# Patient Record
Sex: MALE | Race: OTHER | Employment: FULL TIME | URBAN - METROPOLITAN AREA
[De-identification: names, ages, dates, MRNs, and addresses within clinical notes are randomized per-mention and may not be internally consistent; named-entity substitution may affect disease eponyms.]

---

## 2017-02-09 ENCOUNTER — OFFICE VISIT (OUTPATIENT)
Dept: INTERNAL MEDICINE CLINIC | Facility: CLINIC | Age: 42
End: 2017-02-09

## 2017-02-09 VITALS
HEIGHT: 68.25 IN | TEMPERATURE: 98 F | BODY MASS INDEX: 28.45 KG/M2 | WEIGHT: 187.75 LBS | RESPIRATION RATE: 21 BRPM | OXYGEN SATURATION: 99 % | SYSTOLIC BLOOD PRESSURE: 112 MMHG | DIASTOLIC BLOOD PRESSURE: 76 MMHG | HEART RATE: 87 BPM

## 2017-02-09 DIAGNOSIS — I25.2 HISTORY OF MYOCARDIAL INFARCTION: ICD-10-CM

## 2017-02-09 DIAGNOSIS — L98.9 SKIN DISORDER: ICD-10-CM

## 2017-02-09 DIAGNOSIS — M54.16 CHRONIC LUMBAR RADICULOPATHY: ICD-10-CM

## 2017-02-09 DIAGNOSIS — R53.82 CHRONIC FATIGUE SYNDROME: Primary | ICD-10-CM

## 2017-02-09 DIAGNOSIS — I25.10 CORONARY ARTERY DISEASE INVOLVING NATIVE CORONARY ARTERY OF NATIVE HEART WITHOUT ANGINA PECTORIS: ICD-10-CM

## 2017-02-09 PROCEDURE — 99204 OFFICE O/P NEW MOD 45 MIN: CPT | Performed by: INTERNAL MEDICINE

## 2017-02-09 PROCEDURE — 93000 ELECTROCARDIOGRAM COMPLETE: CPT | Performed by: INTERNAL MEDICINE

## 2017-02-09 NOTE — PROGRESS NOTES
Patient presents with:  Establish Care: New patient. Multiple medical concerns. HPI: The pt is a 38 y/o HM, bilingual, new patient, here to establish PCP and to discuss multiple medical concerns:    1. Chronic fatigue syndrome - Onset = > 1 year.   He Unknown health status[other] [OTHER] Father    • Obesity Mother    • Cancer Mother      Breast cancer   • Cancer Maternal Grandmother      Brain cancer   • Cancer Maternal Grandfather      Prostate cancer   • Diabetes Paternal Grandmother    • [facility]  [address] [facility],[address], [address], [facility], [address] [place] [address]  T: [id number]; F: [phone]       Orders Placed This Encounter  HGB A1C - CPX  URINALYSIS, ROUTINE - CPX  CBC W/DIFF - CPX  COMP METABOLIC PANEL - CPX  TSH - CPX  Testosterone, Total

## 2017-02-10 PROBLEM — R53.82 CHRONIC FATIGUE SYNDROME: Status: ACTIVE | Noted: 2017-02-10

## 2017-02-10 PROBLEM — M54.16 CHRONIC LUMBAR RADICULOPATHY: Status: ACTIVE | Noted: 2017-02-10

## 2017-02-10 PROBLEM — G93.32 CHRONIC FATIGUE SYNDROME: Status: ACTIVE | Noted: 2017-02-10

## 2017-02-15 ENCOUNTER — LAB ENCOUNTER (OUTPATIENT)
Dept: LAB | Age: 42
End: 2017-02-15
Attending: INTERNAL MEDICINE
Payer: COMMERCIAL

## 2017-02-15 DIAGNOSIS — R53.82 CHRONIC FATIGUE SYNDROME: ICD-10-CM

## 2017-02-15 DIAGNOSIS — I25.10 CORONARY ARTERY DISEASE INVOLVING NATIVE CORONARY ARTERY OF NATIVE HEART WITHOUT ANGINA PECTORIS: ICD-10-CM

## 2017-02-15 DIAGNOSIS — I25.2 HISTORY OF MYOCARDIAL INFARCTION: ICD-10-CM

## 2017-02-15 LAB
25-HYDROXYVITAMIN D (TOTAL): 6.6 NG/ML (ref 30–100)
ALBUMIN SERPL-MCNC: 4.5 G/DL (ref 3.5–4.8)
ALP LIVER SERPL-CCNC: 71 U/L (ref 45–117)
ALT SERPL-CCNC: 33 U/L (ref 17–63)
AST SERPL-CCNC: 19 U/L (ref 15–41)
BASOPHILS # BLD AUTO: 0.02 X10(3) UL (ref 0–0.1)
BASOPHILS NFR BLD AUTO: 0.4 %
BILIRUB SERPL-MCNC: 0.7 MG/DL (ref 0.1–2)
BILIRUB UR QL STRIP.AUTO: NEGATIVE
BUN BLD-MCNC: 10 MG/DL (ref 8–20)
CALCIUM BLD-MCNC: 9.3 MG/DL (ref 8.3–10.3)
CHLORIDE: 105 MMOL/L (ref 101–111)
CHOLEST SMN-MCNC: 197 MG/DL (ref ?–200)
CLARITY UR REFRACT.AUTO: CLEAR
CO2: 27 MMOL/L (ref 22–32)
COLOR UR AUTO: YELLOW
CREAT BLD-MCNC: 1.05 MG/DL (ref 0.7–1.3)
DEPRECATED HBV CORE AB SER IA-ACNC: 180.6 NG/ML (ref 22–322)
EOSINOPHIL # BLD AUTO: 0.09 X10(3) UL (ref 0–0.3)
EOSINOPHIL NFR BLD AUTO: 1.6 %
ERYTHROCYTE [DISTWIDTH] IN BLOOD BY AUTOMATED COUNT: 13 % (ref 11.5–16)
EST. AVERAGE GLUCOSE BLD GHB EST-MCNC: 105 MG/DL (ref 68–126)
FOLATE (FOLIC ACID), SERUM: 15.4 NG/ML (ref 8.7–24)
GLUCOSE BLD-MCNC: 92 MG/DL (ref 70–99)
GLUCOSE UR STRIP.AUTO-MCNC: NEGATIVE MG/DL
HAV AB SERPL IA-ACNC: 506 PG/ML (ref 193–986)
HBA1C MFR BLD HPLC: 5.3 % (ref ?–5.7)
HCT VFR BLD AUTO: 44.7 % (ref 37–53)
HDLC SERPL-MCNC: 43 MG/DL (ref 45–?)
HDLC SERPL: 4.58 {RATIO} (ref ?–4.97)
HGB BLD-MCNC: 14.8 G/DL (ref 13–17)
IMMATURE GRANULOCYTE COUNT: 0.01 X10(3) UL (ref 0–1)
IMMATURE GRANULOCYTE RATIO %: 0.2 %
IRON SATURATION: 29 % (ref 13–45)
IRON: 115 UG/DL (ref 45–182)
KETONES UR STRIP.AUTO-MCNC: NEGATIVE MG/DL
LDLC SERPL CALC-MCNC: 109 MG/DL (ref ?–130)
LEUKOCYTE ESTERASE UR QL STRIP.AUTO: NEGATIVE
LYMPHOCYTES # BLD AUTO: 2.11 X10(3) UL (ref 0.9–4)
LYMPHOCYTES NFR BLD AUTO: 38.4 %
M PROTEIN MFR SERPL ELPH: 7.7 G/DL (ref 6.1–8.3)
MCH RBC QN AUTO: 28.4 PG (ref 27–33.2)
MCHC RBC AUTO-ENTMCNC: 33.1 G/DL (ref 31–37)
MCV RBC AUTO: 85.8 FL (ref 80–99)
MONOCYTES # BLD AUTO: 0.41 X10(3) UL (ref 0.1–0.6)
MONOCYTES NFR BLD AUTO: 7.5 %
NEUTROPHIL ABS PRELIM: 2.85 X10 (3) UL (ref 1.3–6.7)
NEUTROPHILS # BLD AUTO: 2.85 X10(3) UL (ref 1.3–6.7)
NEUTROPHILS NFR BLD AUTO: 51.9 %
NITRITE UR QL STRIP.AUTO: NEGATIVE
NONHDLC SERPL-MCNC: 154 MG/DL (ref ?–130)
PH UR STRIP.AUTO: 7 [PH] (ref 4.5–8)
PLATELET # BLD AUTO: 248 10(3)UL (ref 150–450)
POTASSIUM SERPL-SCNC: 4.1 MMOL/L (ref 3.6–5.1)
PROT UR STRIP.AUTO-MCNC: NEGATIVE MG/DL
RBC # BLD AUTO: 5.21 X10(6)UL (ref 4.3–5.7)
RED CELL DISTRIBUTION WIDTH-SD: 40.2 FL (ref 35.1–46.3)
SODIUM SERPL-SCNC: 139 MMOL/L (ref 136–144)
SP GR UR STRIP.AUTO: 1.02 (ref 1–1.03)
TOTAL IRON BINDING CAPACITY: 392 UG/DL (ref 298–536)
TRANSFERRIN: 263 MG/DL (ref 200–360)
TRIGLYCERIDES: 226 MG/DL (ref ?–150)
TSI SER-ACNC: 2.67 MIU/ML (ref 0.35–5.5)
UROBILINOGEN UR STRIP.AUTO-MCNC: <2 MG/DL
VLDL: 45 MG/DL (ref 5–40)
WBC # BLD AUTO: 5.5 X10(3) UL (ref 4–13)

## 2017-02-15 PROCEDURE — 82746 ASSAY OF FOLIC ACID SERUM: CPT | Performed by: INTERNAL MEDICINE

## 2017-02-15 PROCEDURE — 83540 ASSAY OF IRON: CPT | Performed by: INTERNAL MEDICINE

## 2017-02-15 PROCEDURE — 83036 HEMOGLOBIN GLYCOSYLATED A1C: CPT | Performed by: INTERNAL MEDICINE

## 2017-02-15 PROCEDURE — 80053 COMPREHEN METABOLIC PANEL: CPT | Performed by: INTERNAL MEDICINE

## 2017-02-15 PROCEDURE — 82607 VITAMIN B-12: CPT | Performed by: INTERNAL MEDICINE

## 2017-02-15 PROCEDURE — 82306 VITAMIN D 25 HYDROXY: CPT | Performed by: INTERNAL MEDICINE

## 2017-02-15 PROCEDURE — 84402 ASSAY OF FREE TESTOSTERONE: CPT | Performed by: INTERNAL MEDICINE

## 2017-02-15 PROCEDURE — 83550 IRON BINDING TEST: CPT | Performed by: INTERNAL MEDICINE

## 2017-02-15 PROCEDURE — 82728 ASSAY OF FERRITIN: CPT | Performed by: INTERNAL MEDICINE

## 2017-02-15 PROCEDURE — 80061 LIPID PANEL: CPT | Performed by: INTERNAL MEDICINE

## 2017-02-15 PROCEDURE — 85025 COMPLETE CBC W/AUTO DIFF WBC: CPT | Performed by: INTERNAL MEDICINE

## 2017-02-15 PROCEDURE — 81001 URINALYSIS AUTO W/SCOPE: CPT | Performed by: INTERNAL MEDICINE

## 2017-02-15 PROCEDURE — 84443 ASSAY THYROID STIM HORMONE: CPT | Performed by: INTERNAL MEDICINE

## 2017-02-15 PROCEDURE — 36415 COLL VENOUS BLD VENIPUNCTURE: CPT | Performed by: INTERNAL MEDICINE

## 2017-02-15 PROCEDURE — 84403 ASSAY OF TOTAL TESTOSTERONE: CPT | Performed by: INTERNAL MEDICINE

## 2017-02-18 LAB
TESTOSTERONE TOTAL: 499 NG/DL
TESTOSTERONE, FREE -MS/MS: 113 PG/ML

## 2017-02-19 DIAGNOSIS — E55.9 VITAMIN D DEFICIENCY: Primary | ICD-10-CM

## 2017-02-19 RX ORDER — ERGOCALCIFEROL 1.25 MG/1
50000 CAPSULE ORAL
Qty: 12 CAPSULE | Refills: 0 | Status: SHIPPED | OUTPATIENT
Start: 2017-02-19 | End: 2019-01-29 | Stop reason: ALTCHOICE

## 2017-02-19 NOTE — PROGRESS NOTES
Script for Vitamin D sent to his pharmacy. Juanita Kwan. Francy Loyola MD  Diplomate, American Board of Internal Medicine  [facility]  [address] [facility],[address], [address], [facility], [address] [place] [address]  T: [id number]; F: [phone]

## 2017-03-09 ENCOUNTER — HOSPITAL ENCOUNTER (OUTPATIENT)
Dept: GENERAL RADIOLOGY | Age: 42
Discharge: HOME | End: 2017-03-09
Attending: INTERNAL MEDICINE
Payer: COMMERCIAL

## 2017-03-09 ENCOUNTER — OFFICE VISIT (OUTPATIENT)
Dept: INTERNAL MEDICINE CLINIC | Facility: CLINIC | Age: 42
End: 2017-03-09

## 2017-03-09 VITALS
SYSTOLIC BLOOD PRESSURE: 120 MMHG | RESPIRATION RATE: 17 BRPM | TEMPERATURE: 98 F | BODY MASS INDEX: 27.51 KG/M2 | DIASTOLIC BLOOD PRESSURE: 80 MMHG | WEIGHT: 181.5 LBS | OXYGEN SATURATION: 99 % | HEIGHT: 68 IN | HEART RATE: 75 BPM

## 2017-03-09 DIAGNOSIS — G89.29 CHRONIC LEFT SHOULDER PAIN: ICD-10-CM

## 2017-03-09 DIAGNOSIS — E55.9 VITAMIN D DEFICIENCY: ICD-10-CM

## 2017-03-09 DIAGNOSIS — M25.512 CHRONIC LEFT SHOULDER PAIN: ICD-10-CM

## 2017-03-09 DIAGNOSIS — G89.29 CHRONIC PAIN OF LEFT KNEE: ICD-10-CM

## 2017-03-09 DIAGNOSIS — M54.16 CHRONIC LUMBAR RADICULOPATHY: Primary | ICD-10-CM

## 2017-03-09 DIAGNOSIS — M25.562 CHRONIC PAIN OF LEFT KNEE: ICD-10-CM

## 2017-03-09 PROCEDURE — 99214 OFFICE O/P EST MOD 30 MIN: CPT | Performed by: INTERNAL MEDICINE

## 2017-03-09 PROCEDURE — 73560 X-RAY EXAM OF KNEE 1 OR 2: CPT

## 2017-03-09 PROCEDURE — 73030 X-RAY EXAM OF SHOULDER: CPT

## 2017-03-09 NOTE — PROGRESS NOTES
Patient presents with: Follow - Up: back pain, L shoulder pain and L knee pain   Other: Vitamin D def      HPI: The pt presents today for f/u chronic lumbar radiculopathy, but also presents today for L shoulder pain and L knee pain evaluations.   He's also crepitus in arc of extension but no joint tenderness; negative Luis A's  Back - ROM seems improved when compared to previous visit  Ext - no c/c/e  Neuro - nl gait; 2+ DTRs  Psych - nl mood/affect      A/P:  Chronic lumbar radiculopathy  (primary encount

## 2017-03-24 ENCOUNTER — APPOINTMENT (OUTPATIENT)
Dept: LAB | Age: 42
End: 2017-03-24
Attending: INTERNAL MEDICINE
Payer: COMMERCIAL

## 2017-03-24 DIAGNOSIS — E55.9 VITAMIN D DEFICIENCY: ICD-10-CM

## 2017-03-24 LAB — 25-HYDROXYVITAMIN D (TOTAL): 29 NG/ML (ref 30–100)

## 2017-03-24 PROCEDURE — 82306 VITAMIN D 25 HYDROXY: CPT

## 2017-03-24 PROCEDURE — 36415 COLL VENOUS BLD VENIPUNCTURE: CPT

## 2017-03-27 PROBLEM — E55.9 VITAMIN D DEFICIENCY: Status: RESOLVED | Noted: 2017-02-19 | Resolved: 2017-03-27

## 2017-05-25 ENCOUNTER — HOSPITAL ENCOUNTER (OUTPATIENT)
Dept: GENERAL RADIOLOGY | Age: 42
Discharge: HOME | End: 2017-05-25
Attending: INTERNAL MEDICINE
Payer: COMMERCIAL

## 2017-05-25 ENCOUNTER — OFFICE VISIT (OUTPATIENT)
Dept: INTERNAL MEDICINE CLINIC | Facility: CLINIC | Age: 42
End: 2017-05-25

## 2017-05-25 VITALS
TEMPERATURE: 98 F | RESPIRATION RATE: 16 BRPM | DIASTOLIC BLOOD PRESSURE: 76 MMHG | WEIGHT: 180 LBS | BODY MASS INDEX: 27.28 KG/M2 | HEIGHT: 68 IN | SYSTOLIC BLOOD PRESSURE: 112 MMHG | HEART RATE: 88 BPM

## 2017-05-25 DIAGNOSIS — M54.6 ACUTE BILATERAL THORACIC BACK PAIN: ICD-10-CM

## 2017-05-25 DIAGNOSIS — R07.89 CHEST WALL PAIN: ICD-10-CM
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
## 2017-05-25 DIAGNOSIS — M54.2 ACUTE NECK PAIN: ICD-10-CM

## 2017-05-25 DIAGNOSIS — S13.4XXA WHIPLASH INJURIES, INITIAL ENCOUNTER: ICD-10-CM

## 2017-05-25 DIAGNOSIS — M54.2 ACUTE NECK PAIN: Primary | ICD-10-CM

## 2017-05-25 PROCEDURE — 72050 X-RAY EXAM NECK SPINE 4/5VWS: CPT | Performed by: INTERNAL MEDICINE

## 2017-05-25 PROCEDURE — 71020 XR CHEST PA + LAT CHEST (CPT=71020): CPT | Performed by: INTERNAL MEDICINE

## 2017-05-25 PROCEDURE — 99214 OFFICE O/P EST MOD 30 MIN: CPT | Performed by: INTERNAL MEDICINE

## 2017-05-25 PROCEDURE — 72072 X-RAY EXAM THORAC SPINE 3VWS: CPT | Performed by: INTERNAL MEDICINE

## 2017-05-25 RX ORDER — ALPRAZOLAM 0.25 MG/1
0.25 TABLET ORAL EVERY 6 HOURS PRN
Qty: 10 TABLET | Refills: 0 | Status: SHIPPED | OUTPATIENT
Start: 2017-05-25 | End: 2017-06-08
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
## 2017-05-31 ENCOUNTER — HOSPITAL ENCOUNTER (OUTPATIENT)
Age: 42
Discharge: HOME | End: 2017-05-31
Attending: FAMILY MEDICINE
Payer: COMMERCIAL

## 2017-05-31 VITALS
WEIGHT: 176 LBS | HEART RATE: 84 BPM | SYSTOLIC BLOOD PRESSURE: 119 MMHG | OXYGEN SATURATION: 97 % | RESPIRATION RATE: 20 BRPM | HEIGHT: 68 IN | BODY MASS INDEX: 26.67 KG/M2 | TEMPERATURE: 98 F | DIASTOLIC BLOOD PRESSURE: 81 MMHG

## 2017-05-31 DIAGNOSIS — M62.838 TRAPEZIUS MUSCLE SPASM: ICD-10-CM
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
## 2017-05-31 DIAGNOSIS — S16.1XXA CERVICAL STRAIN, INITIAL ENCOUNTER: Primary | ICD-10-CM

## 2017-05-31 PROCEDURE — 99204 OFFICE O/P NEW MOD 45 MIN: CPT

## 2017-05-31 PROCEDURE — 96372 THER/PROPH/DIAG INJ SC/IM: CPT

## 2017-05-31 PROCEDURE — 99214 OFFICE O/P EST MOD 30 MIN: CPT

## 2017-05-31 RX ORDER — METAXALONE 800 MG/1
800 TABLET ORAL 2 TIMES DAILY
Qty: 20 TABLET | Refills: 0 | Status: SHIPPED | OUTPATIENT
Start: 2017-05-31 | End: 2018-07-03

## 2017-05-31 RX ORDER — KETOROLAC TROMETHAMINE 30 MG/ML
60 INJECTION, SOLUTION INTRAMUSCULAR; INTRAVENOUS ONCE
Status: COMPLETED | OUTPATIENT
Start: 2017-05-31 | End: 2017-05-31

## 2017-05-31 RX ORDER — HYDROCODONE BITARTRATE AND ACETAMINOPHEN 5; 325 MG/1; MG/1
1 TABLET ORAL EVERY 6 HOURS PRN
Qty: 20 TABLET | Refills: 0 | Status: SHIPPED | OUTPATIENT
Start: 2017-05-31 | End: 2018-07-03

## 2017-06-01 ENCOUNTER — TELEPHONE (OUTPATIENT)
Dept: INTERNAL MEDICINE CLINIC | Facility: CLINIC | Age: 42
End: 2017-06-01

## 2017-06-01 DIAGNOSIS — S46.819A TRAPEZIUS STRAIN, UNSPECIFIED LATERALITY, INITIAL ENCOUNTER: ICD-10-CM

## 2017-06-01 DIAGNOSIS — S16.1XXA CERVICAL STRAIN, INITIAL ENCOUNTER: ICD-10-CM

## 2017-06-01 DIAGNOSIS — S13.4XXA ACUTE WHIPLASH INJURY, INITIAL ENCOUNTER: Primary | ICD-10-CM
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
## 2017-06-02 ENCOUNTER — TELEPHONE (OUTPATIENT)
Dept: INTERNAL MEDICINE CLINIC | Facility: CLINIC | Age: 42
End: 2017-06-02

## 2017-06-02 NOTE — TELEPHONE ENCOUNTER
I will write a note for today, he can discuss further with Dr. Andre Scott at next week's visit if he requires further time off. Note in his chart, copy in my out box.

## 2017-06-06 ENCOUNTER — OFFICE VISIT (OUTPATIENT)
Dept: PHYSICAL THERAPY | Age: 42
End: 2017-06-06
Attending: INTERNAL MEDICINE
Payer: COMMERCIAL

## 2017-06-06 DIAGNOSIS — S13.4XXA ACUTE WHIPLASH INJURY, INITIAL ENCOUNTER: Primary | ICD-10-CM

## 2017-06-06 DIAGNOSIS — S16.1XXA CERVICAL STRAIN, INITIAL ENCOUNTER: ICD-10-CM

## 2017-06-06 DIAGNOSIS — S46.819A TRAPEZIUS STRAIN, UNSPECIFIED LATERALITY, INITIAL ENCOUNTER: ICD-10-CM

## 2017-06-06 PROCEDURE — 97163 PT EVAL HIGH COMPLEX 45 MIN: CPT

## 2017-06-06 PROCEDURE — 97110 THERAPEUTIC EXERCISES: CPT
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
## 2017-06-08 ENCOUNTER — OFFICE VISIT (OUTPATIENT)
Dept: PHYSICAL THERAPY | Age: 42
End: 2017-06-08
Attending: INTERNAL MEDICINE
Payer: COMMERCIAL

## 2017-06-08 ENCOUNTER — OFFICE VISIT (OUTPATIENT)
Dept: INTERNAL MEDICINE CLINIC | Facility: CLINIC | Age: 42
End: 2017-06-08

## 2017-06-08 VITALS
HEIGHT: 68.5 IN | DIASTOLIC BLOOD PRESSURE: 84 MMHG | HEART RATE: 72 BPM | WEIGHT: 179.5 LBS | BODY MASS INDEX: 26.89 KG/M2 | TEMPERATURE: 98 F | RESPIRATION RATE: 17 BRPM | SYSTOLIC BLOOD PRESSURE: 140 MMHG

## 2017-06-08 DIAGNOSIS — R51.9 GENERALIZED HEADACHES: ICD-10-CM

## 2017-06-08 DIAGNOSIS — S16.1XXD CERVICAL STRAIN, SUBSEQUENT ENCOUNTER: ICD-10-CM

## 2017-06-08 DIAGNOSIS — M54.12 CERVICAL RADICULOPATHY: ICD-10-CM
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
## 2017-06-08 DIAGNOSIS — R42 DIZZINESS: ICD-10-CM

## 2017-06-08 PROCEDURE — 97140 MANUAL THERAPY 1/> REGIONS: CPT

## 2017-06-08 PROCEDURE — 97110 THERAPEUTIC EXERCISES: CPT

## 2017-06-08 PROCEDURE — 99214 OFFICE O/P EST MOD 30 MIN: CPT | Performed by: INTERNAL MEDICINE

## 2017-06-08 RX ORDER — METHYLPREDNISOLONE 4 MG/1
TABLET ORAL
Qty: 1 KIT | Refills: 0 | Status: SHIPPED | OUTPATIENT
Start: 2017-06-08 | End: 2018-07-03
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
## 2017-06-13 ENCOUNTER — OFFICE VISIT (OUTPATIENT)
Dept: PHYSICAL THERAPY | Age: 42
End: 2017-06-13
Attending: INTERNAL MEDICINE
Payer: COMMERCIAL

## 2017-06-13 PROCEDURE — 97110 THERAPEUTIC EXERCISES: CPT

## 2017-06-13 PROCEDURE — 97140 MANUAL THERAPY 1/> REGIONS: CPT
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
## 2017-06-15 ENCOUNTER — OFFICE VISIT (OUTPATIENT)
Dept: PHYSICAL THERAPY | Age: 42
End: 2017-06-15
Attending: INTERNAL MEDICINE
Payer: COMMERCIAL

## 2017-06-15 PROCEDURE — 97140 MANUAL THERAPY 1/> REGIONS: CPT

## 2017-06-15 PROCEDURE — 97110 THERAPEUTIC EXERCISES: CPT
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
## 2017-06-27 ENCOUNTER — APPOINTMENT (OUTPATIENT)
Dept: PHYSICAL THERAPY | Age: 42
End: 2017-06-27
Attending: INTERNAL MEDICINE
Payer: COMMERCIAL

## 2017-06-29 ENCOUNTER — APPOINTMENT (OUTPATIENT)
Dept: PHYSICAL THERAPY | Age: 42
End: 2017-06-29
Attending: INTERNAL MEDICINE
Payer: COMMERCIAL

## 2018-07-03 ENCOUNTER — OFFICE VISIT (OUTPATIENT)
Dept: INTERNAL MEDICINE CLINIC | Facility: CLINIC | Age: 43
End: 2018-07-03

## 2018-07-03 VITALS
DIASTOLIC BLOOD PRESSURE: 68 MMHG | HEIGHT: 68.5 IN | OXYGEN SATURATION: 98 % | HEART RATE: 95 BPM | WEIGHT: 179 LBS | RESPIRATION RATE: 16 BRPM | TEMPERATURE: 98 F | SYSTOLIC BLOOD PRESSURE: 120 MMHG | BODY MASS INDEX: 26.82 KG/M2

## 2018-07-03 DIAGNOSIS — M54.50 CHRONIC LEFT-SIDED LOW BACK PAIN WITHOUT SCIATICA: ICD-10-CM

## 2018-07-03 DIAGNOSIS — Z00.00 WELLNESS EXAMINATION: Primary | ICD-10-CM

## 2018-07-03 DIAGNOSIS — M54.2 CHRONIC NECK PAIN: ICD-10-CM

## 2018-07-03 DIAGNOSIS — Z00.00 LABORATORY EXAM ORDERED AS PART OF ROUTINE GENERAL MEDICAL EXAMINATION: ICD-10-CM

## 2018-07-03 DIAGNOSIS — M25.562 CHRONIC PAIN OF BOTH KNEES: ICD-10-CM

## 2018-07-03 DIAGNOSIS — G89.29 CHRONIC LEFT-SIDED LOW BACK PAIN WITHOUT SCIATICA: ICD-10-CM

## 2018-07-03 DIAGNOSIS — G89.29 CHRONIC PAIN OF BOTH KNEES: ICD-10-CM
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
## 2018-07-03 DIAGNOSIS — G89.29 CHRONIC NECK PAIN: ICD-10-CM

## 2018-07-03 DIAGNOSIS — S91.351A DOG BITE OF RIGHT FOOT, INITIAL ENCOUNTER: ICD-10-CM

## 2018-07-03 DIAGNOSIS — K59.00 CONSTIPATION, UNSPECIFIED CONSTIPATION TYPE: ICD-10-CM

## 2018-07-03 DIAGNOSIS — E55.9 HYPOVITAMINOSIS D: ICD-10-CM

## 2018-07-03 DIAGNOSIS — M25.561 CHRONIC PAIN OF BOTH KNEES: ICD-10-CM

## 2018-07-03 PROCEDURE — 99396 PREV VISIT EST AGE 40-64: CPT | Performed by: INTERNAL MEDICINE

## 2018-07-03 PROCEDURE — 99213 OFFICE O/P EST LOW 20 MIN: CPT | Performed by: INTERNAL MEDICINE

## 2018-07-03 RX ORDER — NAPROXEN 500 MG/1
500 TABLET ORAL 2 TIMES DAILY WITH MEALS
Qty: 30 TABLET | Refills: 0 | Status: SHIPPED | OUTPATIENT
Start: 2018-07-03 | End: 2019-01-29 | Stop reason: ALTCHOICE

## 2018-07-03 NOTE — PROGRESS NOTES
[facility] Internal Medicine Office Note  Chief Complaint:   Patient presents with: Well Adult: annual  Bite (integumentary): follow up on dog bite right foot.        HPI:   This is a 40year old male coming in for physical and joint pains  HPI Prescriptions:  Nerve Stimulator (TENS THERAPY PAIN RELIEF) Does not apply Device 1 Application by Does not apply route 2 (two) times daily.  Disp: 1 Device Rfl: 0   ergocalciferol 41722 units Oral Cap Take 1 capsule (50,000 Units total) by mouth every 7 da He exhibits tenderness (of lower cervical spine and upper lumbar spine). Neurological: He is alert. Skin: No rash noted. Psychiatric: He has a normal mood and affect.        ASSESSMENT AND PLAN:   Natasha Vincent is a 40year old male with  Usman La unspecified constipation type - continue probiotic and plenty of water.  Add fiber supplement such as benefiber or metamucil. miralax if no BM in more than 2 days      Orders Placed This Encounter      TSH W Reflex To Free T4 [E]      Lipid Panel [E]      C

## 2018-07-03 NOTE — PATIENT INSTRUCTIONS
Blood work     Start 7-10 day course of naproxen twice daily and take with food     Consider miralax if no bowel movement in several days  Consider fiber supplement metamucil or benefiber     Start physical therapy     Follow up with orthopedics/Dr. Clint Abad if

## 2018-07-07 ENCOUNTER — LAB ENCOUNTER (OUTPATIENT)
Dept: LAB | Facility: HOSPITAL | Age: 43
End: 2018-07-07
Attending: INTERNAL MEDICINE
Payer: COMMERCIAL

## 2018-07-07 DIAGNOSIS — Z20.2 POSSIBLE EXPOSURE TO STD: ICD-10-CM

## 2018-07-07 DIAGNOSIS — E55.9 HYPOVITAMINOSIS D: ICD-10-CM

## 2018-07-07 DIAGNOSIS — Z00.00 LABORATORY EXAM ORDERED AS PART OF ROUTINE GENERAL MEDICAL EXAMINATION: ICD-10-CM

## 2018-07-07 LAB
25-HYDROXYVITAMIN D (TOTAL): 21.6 NG/ML (ref 30–100)
ALBUMIN SERPL-MCNC: 4.1 G/DL (ref 3.5–4.8)
ALP LIVER SERPL-CCNC: 61 U/L (ref 45–117)
ALT SERPL-CCNC: 40 U/L (ref 17–63)
AST SERPL-CCNC: 22 U/L (ref 15–41)
BASOPHILS # BLD AUTO: 0.02 X10(3) UL (ref 0–0.1)
BASOPHILS NFR BLD AUTO: 0.3 %
BILIRUB SERPL-MCNC: 0.5 MG/DL (ref 0.1–2)
BUN BLD-MCNC: 16 MG/DL (ref 8–20)
CALCIUM BLD-MCNC: 8.7 MG/DL (ref 8.3–10.3)
CHLORIDE: 109 MMOL/L (ref 101–111)
CHOLEST SMN-MCNC: 195 MG/DL (ref ?–200)
CO2: 27 MMOL/L (ref 22–32)
CREAT BLD-MCNC: 0.88 MG/DL (ref 0.7–1.3)
EOSINOPHIL # BLD AUTO: 0.14 X10(3) UL (ref 0–0.3)
EOSINOPHIL NFR BLD AUTO: 2.1 %
ERYTHROCYTE [DISTWIDTH] IN BLOOD BY AUTOMATED COUNT: 12.9 % (ref 11.5–16)
GLUCOSE BLD-MCNC: 74 MG/DL (ref 70–99)
HCT VFR BLD AUTO: 41.7 % (ref 37–53)
HDLC SERPL-MCNC: 41 MG/DL (ref 45–?)
HDLC SERPL: 4.76 {RATIO} (ref ?–4.97)
HGB BLD-MCNC: 13.6 G/DL (ref 13–17)
IMMATURE GRANULOCYTE COUNT: 0.03 X10(3) UL (ref 0–1)
IMMATURE GRANULOCYTE RATIO %: 0.5 %
LDLC SERPL CALC-MCNC: 105 MG/DL (ref ?–130)
LYMPHOCYTES # BLD AUTO: 1.78 X10(3) UL (ref 0.9–4)
LYMPHOCYTES NFR BLD AUTO: 26.8 %
M PROTEIN MFR SERPL ELPH: 7.4 G/DL (ref 6.1–8.3)
MCH RBC QN AUTO: 27.6 PG (ref 27–33.2)
MCHC RBC AUTO-ENTMCNC: 32.6 G/DL (ref 31–37)
MCV RBC AUTO: 84.6 FL (ref 80–99)
MONOCYTES # BLD AUTO: 0.5 X10(3) UL (ref 0.1–1)
MONOCYTES NFR BLD AUTO: 7.5 %
NEUTROPHIL ABS PRELIM: 4.17 X10 (3) UL (ref 1.3–6.7)
NEUTROPHILS # BLD AUTO: 4.17 X10(3) UL (ref 1.3–6.7)
NEUTROPHILS NFR BLD AUTO: 62.8 %
NONHDLC SERPL-MCNC: 154 MG/DL (ref ?–130)
PLATELET # BLD AUTO: 246 10(3)UL (ref 150–450)
POTASSIUM SERPL-SCNC: 4.1 MMOL/L (ref 3.6–5.1)
RBC # BLD AUTO: 4.93 X10(6)UL (ref 4.3–5.7)
RED CELL DISTRIBUTION WIDTH-SD: 39.3 FL (ref 35.1–46.3)
SODIUM SERPL-SCNC: 141 MMOL/L (ref 136–144)
TRIGL SERPL-MCNC: 244 MG/DL (ref ?–150)
TSI SER-ACNC: 3.24 MIU/ML (ref 0.35–5.5)
VLDLC SERPL CALC-MCNC: 49 MG/DL (ref 5–40)
WBC # BLD AUTO: 6.6 X10(3) UL (ref 4–13)

## 2018-07-07 PROCEDURE — 87389 HIV-1 AG W/HIV-1&-2 AB AG IA: CPT

## 2018-07-07 PROCEDURE — 86592 SYPHILIS TEST NON-TREP QUAL: CPT

## 2018-07-07 PROCEDURE — 36415 COLL VENOUS BLD VENIPUNCTURE: CPT

## 2018-07-07 PROCEDURE — 84443 ASSAY THYROID STIM HORMONE: CPT

## 2018-07-07 PROCEDURE — 86780 TREPONEMA PALLIDUM: CPT

## 2018-07-07 PROCEDURE — 80053 COMPREHEN METABOLIC PANEL: CPT

## 2018-07-07 PROCEDURE — 85025 COMPLETE CBC W/AUTO DIFF WBC: CPT

## 2018-07-07 PROCEDURE — 80061 LIPID PANEL: CPT

## 2018-07-07 PROCEDURE — 82306 VITAMIN D 25 HYDROXY: CPT

## 2018-07-09 ENCOUNTER — TELEPHONE (OUTPATIENT)
Dept: INTERNAL MEDICINE CLINIC | Facility: CLINIC | Age: 43
End: 2018-07-09

## 2018-07-09 DIAGNOSIS — Z20.2 POSSIBLE EXPOSURE TO STD: Primary | ICD-10-CM

## 2018-07-09 NOTE — TELEPHONE ENCOUNTER
Patient was seen by Dr Faviola Chi and he was told he would get orders for STD testing as well as labs that were drawn. He got others drawn but they did not have orders for STD; please request additional orders be entered by doctor.  Call patient back when ente

## 2018-07-10 LAB
RPR SER QL: NONREACTIVE
T PALLIDUM AB SER QL IA: NONREACTIVE

## 2018-07-10 NOTE — TELEPHONE ENCOUNTER
My apologies to patient  Blood work orders were added on to blood already drawn for HIV and syphilis.     He just needs to come to lab for urine sample for gonorrhea/chlamydia testing

## 2018-07-11 RX ORDER — ERGOCALCIFEROL 1.25 MG/1
50000 CAPSULE ORAL WEEKLY
Qty: 12 CAPSULE | Refills: 0 | Status: SHIPPED | OUTPATIENT
Start: 2018-07-11 | End: 2018-10-09

## 2019-01-29 ENCOUNTER — OFFICE VISIT (OUTPATIENT)
Dept: INTERNAL MEDICINE CLINIC | Facility: CLINIC | Age: 44
End: 2019-01-29
Payer: COMMERCIAL

## 2019-01-29 VITALS
WEIGHT: 177.25 LBS | SYSTOLIC BLOOD PRESSURE: 116 MMHG | HEIGHT: 68.75 IN | BODY MASS INDEX: 26.25 KG/M2 | HEART RATE: 91 BPM | OXYGEN SATURATION: 98 % | RESPIRATION RATE: 16 BRPM | DIASTOLIC BLOOD PRESSURE: 66 MMHG | TEMPERATURE: 98 F

## 2019-01-29 DIAGNOSIS — M54.2 NECK PAIN ON LEFT SIDE: Primary | ICD-10-CM

## 2019-01-29 DIAGNOSIS — M54.16 CHRONIC LUMBAR RADICULOPATHY: ICD-10-CM

## 2019-01-29 DIAGNOSIS — G89.29 CHRONIC LEFT-SIDED HEADACHE: ICD-10-CM

## 2019-01-29 DIAGNOSIS — M54.41 CHRONIC BILATERAL LOW BACK PAIN WITH BILATERAL SCIATICA: ICD-10-CM

## 2019-01-29 DIAGNOSIS — G89.29 CHRONIC BILATERAL LOW BACK PAIN WITH BILATERAL SCIATICA: ICD-10-CM

## 2019-01-29 DIAGNOSIS — L98.9 SKIN LESION: ICD-10-CM

## 2019-01-29 DIAGNOSIS — H53.8 BLURRED VISION, LEFT EYE: ICD-10-CM

## 2019-01-29 DIAGNOSIS — M54.42 CHRONIC BILATERAL LOW BACK PAIN WITH BILATERAL SCIATICA: ICD-10-CM

## 2019-01-29 DIAGNOSIS — M54.12 CERVICAL RADICULOPATHY: ICD-10-CM

## 2019-01-29 DIAGNOSIS — R51.9 CHRONIC LEFT-SIDED HEADACHE: ICD-10-CM

## 2019-01-29 PROCEDURE — 99214 OFFICE O/P EST MOD 30 MIN: CPT | Performed by: PHYSICIAN ASSISTANT

## 2019-01-29 RX ORDER — HYDROCODONE BITARTRATE AND ACETAMINOPHEN 5; 325 MG/1; MG/1
1 TABLET ORAL NIGHTLY PRN
Qty: 12 TABLET | Refills: 0 | Status: SHIPPED | OUTPATIENT
Start: 2019-01-29 | End: 2019-02-11

## 2019-01-29 NOTE — PATIENT INSTRUCTIONS
Please call Stevenson Perez at [phone] to set up the following tests:  - MRI brain  - MRI cervical spine  - MRI lumbar spine    Pain:  - stop aleve (and do not take advil, ibuprofen, motrin, aspirin, naproxen)  - start diclofenac -
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
## 2019-02-09 ENCOUNTER — APPOINTMENT (OUTPATIENT)
Dept: MRI IMAGING | Age: 44
End: 2019-02-09
Attending: PHYSICIAN ASSISTANT
Payer: COMMERCIAL

## 2019-02-09 ENCOUNTER — HOSPITAL ENCOUNTER (OUTPATIENT)
Dept: MRI IMAGING | Age: 44
End: 2019-02-09
Attending: PHYSICIAN ASSISTANT
Payer: COMMERCIAL

## 2019-02-09 ENCOUNTER — HOSPITAL ENCOUNTER (OUTPATIENT)
Dept: MRI IMAGING | Facility: HOSPITAL | Age: 44
Discharge: HOME | End: 2019-02-09
Attending: PHYSICIAN ASSISTANT
Payer: COMMERCIAL

## 2019-02-09 DIAGNOSIS — G89.29 CHRONIC LEFT-SIDED HEADACHE: ICD-10-CM

## 2019-02-09 DIAGNOSIS — M54.16 CHRONIC LUMBAR RADICULOPATHY: ICD-10-CM

## 2019-02-09 DIAGNOSIS — R51.9 CHRONIC LEFT-SIDED HEADACHE: ICD-10-CM

## 2019-02-09 DIAGNOSIS — M54.41 CHRONIC BILATERAL LOW BACK PAIN WITH BILATERAL SCIATICA: ICD-10-CM

## 2019-02-09 DIAGNOSIS — G89.29 CHRONIC BILATERAL LOW BACK PAIN WITH BILATERAL SCIATICA: ICD-10-CM

## 2019-02-09 DIAGNOSIS — M54.42 CHRONIC BILATERAL LOW BACK PAIN WITH BILATERAL SCIATICA: ICD-10-CM

## 2019-02-09 DIAGNOSIS — H53.8 BLURRED VISION, LEFT EYE: ICD-10-CM

## 2019-02-09 DIAGNOSIS — M54.2 NECK PAIN ON LEFT SIDE: ICD-10-CM

## 2019-02-09 DIAGNOSIS — M54.12 CERVICAL RADICULOPATHY: ICD-10-CM

## 2019-02-09 PROCEDURE — 72141 MRI NECK SPINE W/O DYE: CPT | Performed by: PHYSICIAN ASSISTANT

## 2019-02-09 PROCEDURE — 72148 MRI LUMBAR SPINE W/O DYE: CPT | Performed by: PHYSICIAN ASSISTANT

## 2019-02-09 PROCEDURE — 70553 MRI BRAIN STEM W/O & W/DYE: CPT | Performed by: PHYSICIAN ASSISTANT

## 2019-02-09 PROCEDURE — A9575 INJ GADOTERATE MEGLUMI 0.1ML: HCPCS | Performed by: PHYSICIAN ASSISTANT

## 2019-02-11 NOTE — TELEPHONE ENCOUNTER
Pt requesting refill on norco 5-325 mg 1 tab qhs #12 until she is able to see pain management. Last refill 1/29/19. Last OV 1/29/19.

## 2019-02-12 ENCOUNTER — TELEPHONE (OUTPATIENT)
Dept: SURGERY | Facility: CLINIC | Age: 44
End: 2019-02-12

## 2019-02-12 NOTE — TELEPHONE ENCOUNTER
Refill requested:   Requested Prescriptions     Pending Prescriptions Disp Refills   • HYDROcodone-acetaminophen 5-325 MG Oral Tab 12 tablet 0     Sig: Take 1 tablet by mouth nightly as needed for Pain.        Non protocol medication    Last refill: 1/29/20

## 2019-02-12 NOTE — TELEPHONE ENCOUNTER
Refill requested:   Requested Prescriptions     Pending Prescriptions Disp Refills   • Diclofenac Sodium 50 MG Oral Tab EC 30 tablet 0     Sig: Take 1 tablet (50 mg total) by mouth 2 (two) times daily as needed. Take with food.    • HYDROcodone-acetaminophe

## 2019-02-13 RX ORDER — HYDROCODONE BITARTRATE AND ACETAMINOPHEN 5; 325 MG/1; MG/1
1 TABLET ORAL NIGHTLY PRN
Qty: 12 TABLET | Refills: 0 | OUTPATIENT
Start: 2019-02-13

## 2019-02-13 RX ORDER — HYDROCODONE BITARTRATE AND ACETAMINOPHEN 5; 325 MG/1; MG/1
1 TABLET ORAL NIGHTLY PRN
Qty: 12 TABLET | Refills: 0 | Status: SHIPPED | OUTPATIENT
Start: 2019-02-13 | End: 2019-03-02

## 2019-02-18 ENCOUNTER — TELEPHONE (OUTPATIENT)
Dept: INTERNAL MEDICINE CLINIC | Facility: CLINIC | Age: 44
End: 2019-02-18

## 2019-02-18 DIAGNOSIS — M54.12 CERVICAL RADICULOPATHY: Primary | ICD-10-CM

## 2019-02-18 DIAGNOSIS — M54.2 NECK PAIN ON LEFT SIDE: ICD-10-CM

## 2019-02-18 DIAGNOSIS — M54.16 CHRONIC LUMBAR RADICULOPATHY: ICD-10-CM

## 2019-02-18 NOTE — TELEPHONE ENCOUNTER
Pain Management Referral - Pt in office now   Received:  Today   Message Contents   Koko Patel [facility] Clinical Staff Cc: CIP[facility] Referral Pool   Phone Number: [phone]             .Reason for the order/referral: Back & Neck Pain   PCP: Darcy

## 2019-02-20 ENCOUNTER — TELEPHONE (OUTPATIENT)
Dept: PAIN CLINIC | Facility: CLINIC | Age: 44
End: 2019-02-20

## 2019-02-20 ENCOUNTER — OFFICE VISIT (OUTPATIENT)
Dept: PAIN CLINIC | Facility: CLINIC | Age: 44
End: 2019-02-20
Payer: COMMERCIAL

## 2019-02-20 VITALS
WEIGHT: 173 LBS | DIASTOLIC BLOOD PRESSURE: 78 MMHG | SYSTOLIC BLOOD PRESSURE: 126 MMHG | HEIGHT: 68 IN | HEART RATE: 94 BPM | OXYGEN SATURATION: 98 % | BODY MASS INDEX: 26.22 KG/M2

## 2019-02-20 DIAGNOSIS — M47.812 ARTHROPATHY OF CERVICAL FACET JOINT: Primary | ICD-10-CM

## 2019-02-20 DIAGNOSIS — M54.81 OCCIPITAL NEURALGIA OF LEFT SIDE: ICD-10-CM

## 2019-02-20 DIAGNOSIS — M47.812 OSTEOARTHRITIS OF CERVICAL SPINE, UNSPECIFIED SPINAL OSTEOARTHRITIS COMPLICATION STATUS: ICD-10-CM

## 2019-02-20 DIAGNOSIS — R93.89 ABNORMAL X-RAY: ICD-10-CM

## 2019-02-20 DIAGNOSIS — M54.16 LUMBAR RADICULOPATHY: ICD-10-CM

## 2019-02-20 DIAGNOSIS — M25.562 CHRONIC PAIN OF LEFT KNEE: ICD-10-CM

## 2019-02-20 DIAGNOSIS — G89.29 CHRONIC PAIN OF LEFT KNEE: ICD-10-CM

## 2019-02-20 PROCEDURE — 99203 OFFICE O/P NEW LOW 30 MIN: CPT | Performed by: NURSE PRACTITIONER

## 2019-02-20 RX ORDER — GABAPENTIN 100 MG/1
100 CAPSULE ORAL EVERY 8 HOURS
Qty: 90 CAPSULE | Refills: 0 | Status: SHIPPED | OUTPATIENT
Start: 2019-02-20 | End: 2019-03-18 | Stop reason: ALTCHOICE

## 2019-02-20 RX ORDER — METHOCARBAMOL 500 MG/1
500 TABLET, FILM COATED ORAL EVERY 8 HOURS PRN
Qty: 90 TABLET | Refills: 0 | Status: SHIPPED | OUTPATIENT
Start: 2019-02-20 | End: 2019-03-18 | Stop reason: ALTCHOICE

## 2019-02-20 RX ORDER — METHYLPREDNISOLONE 4 MG/1
TABLET ORAL
Qty: 1 KIT | Refills: 0 | Status: SHIPPED | OUTPATIENT
Start: 2019-02-20 | End: 2019-03-02

## 2019-02-20 NOTE — H&P
Name: Marina Chawla   : 1975   DOS: 2019     Chief complaint: Chronic neck and low back pain    History of present illness:  Marina Chawla is a 44year old male complaining of chronic neck and back pain.  Neck pain fla methocarbamol 500 MG Oral Tab Take 1 tablet (500 mg total) by mouth every 8 (eight) hours as needed (for muscle spasm). Disp: 90 tablet Rfl: 0   gabapentin 100 MG Oral Cap Take 1 capsule (100 mg total) by mouth every 8 (eight) hours.  Disp: 90 capsule Rfl examination:   General: Susy Delgado is a 44year old male not in acute distress  /78 (BP Location: Right arm, Patient Position: Sitting, Cuff Size: adult)   Pulse 94   Ht 68\"   Wt 173 lb   SpO2 98%   BMI 26.30 kg/m²    HEENT: No gross lesion noted.   Nec cord and nerve roots have normal caliber, contour, and signal intensity. PARASPINAL AREA:     No visible mass. OTHER:             Negative. LUMBAR DISC LEVELS:  L1-L2:   No significant disc/facet abnormality, spinal stenosis, or foraminal stenosis. Mild multilevel spondylosis without abnormal signal  CORD:  Normal caliber, contour, and signal intensity. OTHER:             Negative. CERVICAL DISC LEVELS:  C2-C3:  No significant disc/facet abnormality, spinal stenosis, or foraminal stenosis. or subacute ischemia. CEREBELLUM: No edema, hemorrhage, mass, acute infarction, or significant atrophy. BRAINSTEM:    The craniocervical junction is uncompromised.     CALVARIUM:    There is no apparent fracture, mass, or other significant visible lesio injection  Left greater occipital nerve block  I have discussed the injection procedures, risks, and benefits with patient today and he would like to proceed. Follow up in office 2 weeks after last injection for reevaluation of pain at that time.  If neck

## 2019-02-20 NOTE — TELEPHONE ENCOUNTER
Medical clearance needed- no    Pt seen in OV today by Lisa and recommended for Facet and GONB with Dr. Rachid Jerez (X 2). Please begin PA process for procedure(s).      Laterality: left facet  Level(s): C3-7    Laterality: left  Level(s): GONB    Pt informed call
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
## 2019-02-21 NOTE — TELEPHONE ENCOUNTER
Started Pa online on [facility] for facet  Uploaded clinical notes  Pending ref # [id number]

## 2019-02-26 NOTE — TELEPHONE ENCOUNTER
Spoke to pt. Informed him of info below. Advised pt to allow a full week to see the effects and contact our office if still not experiencing relief. Pt verbalized understanding.

## 2019-02-26 NOTE — TELEPHONE ENCOUNTER
Called patient and scheduled for:    Appointment Date:  3/6/19  Procedure Time:  100 pm  Check-in Time:  1200 pm    Patient states that he has pre-op procedures. Patient verbalized understanding.

## 2019-02-26 NOTE — TELEPHONE ENCOUNTER
Patient states that he is taking gabapentin 3 times a day, but around 1030 pm he is noticing the pain is getting pretty bad. Patient is wondering if he can take 2 tabs instead of 1?  Patient states that the effects of medication only last a couple of hours

## 2019-02-26 NOTE — TELEPHONE ENCOUNTER
Facet approved [id number]   [id number]  DOS 2/27/19 please let me know what date this get scheduled for

## 2019-03-01 ENCOUNTER — TELEPHONE (OUTPATIENT)
Dept: SURGERY | Facility: CLINIC | Age: 44
End: 2019-03-01

## 2019-03-01 NOTE — TELEPHONE ENCOUNTER
Spoke to patient, confirmed procedure date of 3/6/19 and to be checked in at outpatient registration at 12:00 pm. Patient instructed to call pre-procedure line before procedure at [phone].  Patient instructed to call office if there are additional ques

## 2019-03-02 ENCOUNTER — HOSPITAL ENCOUNTER (EMERGENCY)
Facility: HOSPITAL | Age: 44
Discharge: HOME | End: 2019-03-02
Attending: EMERGENCY MEDICINE
Payer: COMMERCIAL

## 2019-03-02 VITALS
OXYGEN SATURATION: 100 % | TEMPERATURE: 98 F | WEIGHT: 173.06 LBS | HEART RATE: 78 BPM | SYSTOLIC BLOOD PRESSURE: 120 MMHG | BODY MASS INDEX: 26.23 KG/M2 | RESPIRATION RATE: 20 BRPM | DIASTOLIC BLOOD PRESSURE: 82 MMHG | HEIGHT: 68 IN

## 2019-03-02 DIAGNOSIS — R51.9 ACUTE NONINTRACTABLE HEADACHE, UNSPECIFIED HEADACHE TYPE: Primary | ICD-10-CM

## 2019-03-02 PROCEDURE — 99284 EMERGENCY DEPT VISIT MOD MDM: CPT | Performed by: EMERGENCY MEDICINE

## 2019-03-02 PROCEDURE — 96375 TX/PRO/DX INJ NEW DRUG ADDON: CPT | Performed by: EMERGENCY MEDICINE

## 2019-03-02 PROCEDURE — 96374 THER/PROPH/DIAG INJ IV PUSH: CPT | Performed by: EMERGENCY MEDICINE

## 2019-03-02 PROCEDURE — 96361 HYDRATE IV INFUSION ADD-ON: CPT | Performed by: EMERGENCY MEDICINE

## 2019-03-02 RX ORDER — TRAMADOL HYDROCHLORIDE 50 MG/1
TABLET ORAL EVERY 4 HOURS PRN
Qty: 10 TABLET | Refills: 0 | Status: SHIPPED | OUTPATIENT
Start: 2019-03-02 | End: 2019-03-09

## 2019-03-02 RX ORDER — DIPHENHYDRAMINE HYDROCHLORIDE 50 MG/ML
50 INJECTION INTRAMUSCULAR; INTRAVENOUS ONCE
Status: COMPLETED | OUTPATIENT
Start: 2019-03-02 | End: 2019-03-02

## 2019-03-02 RX ORDER — METOCLOPRAMIDE HYDROCHLORIDE 5 MG/ML
10 INJECTION INTRAMUSCULAR; INTRAVENOUS ONCE
Status: COMPLETED | OUTPATIENT
Start: 2019-03-02 | End: 2019-03-02

## 2019-03-02 RX ORDER — DEXAMETHASONE SODIUM PHOSPHATE 4 MG/ML
10 VIAL (ML) INJECTION ONCE
Status: COMPLETED | OUTPATIENT
Start: 2019-03-02 | End: 2019-03-02

## 2019-03-02 RX ORDER — KETOROLAC TROMETHAMINE 30 MG/ML
15 INJECTION, SOLUTION INTRAMUSCULAR; INTRAVENOUS ONCE
Status: COMPLETED | OUTPATIENT
Start: 2019-03-02 | End: 2019-03-02

## 2019-03-02 NOTE — ED INITIAL ASSESSMENT (HPI)
Arrives with coworker with c/o left occipital headache that began this morning. Sound makes the headache worse.
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
## 2019-03-04 ENCOUNTER — TELEPHONE (OUTPATIENT)
Dept: PAIN CLINIC | Facility: CLINIC | Age: 44
End: 2019-03-04

## 2019-03-04 NOTE — TELEPHONE ENCOUNTER
Noted Mychart message read by patient. Left a detailed message on patient's voicemail informing patient she may call office back with any further questions.

## 2019-03-04 NOTE — TELEPHONE ENCOUNTER
Please cancel injection on 3/6/19. I have recommended that he cancel his scheduled injection on 3/6 and have placed a referral to neurology for further evaluation and treatment of his headaches.  I have also recommended that he f/u with PCP as he stated pippa

## 2019-03-05 ENCOUNTER — OFFICE VISIT (OUTPATIENT)
Dept: INTERNAL MEDICINE CLINIC | Facility: CLINIC | Age: 44
End: 2019-03-05
Payer: COMMERCIAL

## 2019-03-05 VITALS
TEMPERATURE: 98 F | OXYGEN SATURATION: 99 % | HEIGHT: 69 IN | WEIGHT: 180 LBS | HEART RATE: 92 BPM | RESPIRATION RATE: 16 BRPM | DIASTOLIC BLOOD PRESSURE: 82 MMHG | SYSTOLIC BLOOD PRESSURE: 120 MMHG | BODY MASS INDEX: 26.66 KG/M2

## 2019-03-05 DIAGNOSIS — R20.0 NUMBNESS IN BOTH LEGS: ICD-10-CM

## 2019-03-05 DIAGNOSIS — R51.9 SEVERE HEADACHE: Primary | ICD-10-CM

## 2019-03-05 DIAGNOSIS — M54.12 CERVICAL RADICULOPATHY: ICD-10-CM

## 2019-03-05 DIAGNOSIS — M54.2 NECK PAIN ON LEFT SIDE: ICD-10-CM

## 2019-03-05 DIAGNOSIS — M54.16 CHRONIC LUMBAR RADICULOPATHY: ICD-10-CM

## 2019-03-05 PROCEDURE — 99214 OFFICE O/P EST MOD 30 MIN: CPT | Performed by: PHYSICIAN ASSISTANT

## 2019-03-05 RX ORDER — HYDROCODONE BITARTRATE AND ACETAMINOPHEN 5; 325 MG/1; MG/1
1 TABLET ORAL EVERY 8 HOURS PRN
Qty: 12 TABLET | Refills: 0 | Status: SHIPPED | OUTPATIENT
Start: 2019-03-05 | End: 2019-03-18

## 2019-03-05 NOTE — PROGRESS NOTES
Teresa Storey is a 44year old male. HPI:   Patient presents for f/u from ED visit on 3/2/19. Sudden onset that morning of severe pain in head at work. Co-worker who is with him today states he was conscious but \"not responsive\".   C/o Paternal Grandmother    • Other (Healthy) Sister    • Other (Healthy) Brother    • Other (Healthy) Brother         REVIEW OF SYSTEMS:   GENERAL HEALTH: denies fever, chills, night sweats  SKIN: denies any unusual skin lesions or rashes  HEENT: denies sore here in July for wellness visit.

## 2019-03-05 NOTE — PATIENT INSTRUCTIONS
Take medications as prescribed (do not take more than prescribed). Ice/heat -- 10-15 minutes at a time. Ok to stop diclofenac and methocarbamol if not helping. Take gabapentin 1 capsule three times a day. Stop tramadol.     Start norco (*this co

## 2019-03-14 ENCOUNTER — OFFICE VISIT (OUTPATIENT)
Dept: NEUROLOGY | Facility: CLINIC | Age: 44
End: 2019-03-14
Payer: COMMERCIAL

## 2019-03-14 VITALS
DIASTOLIC BLOOD PRESSURE: 66 MMHG | HEART RATE: 99 BPM | SYSTOLIC BLOOD PRESSURE: 120 MMHG | RESPIRATION RATE: 16 BRPM | TEMPERATURE: 98 F | BODY MASS INDEX: 26.59 KG/M2 | WEIGHT: 179.5 LBS | OXYGEN SATURATION: 98 % | HEIGHT: 69 IN

## 2019-03-14 DIAGNOSIS — M54.81 OCCIPITAL NEURALGIA OF LEFT SIDE: Primary | ICD-10-CM

## 2019-03-14 DIAGNOSIS — M54.50 CHRONIC MIDLINE LOW BACK PAIN WITHOUT SCIATICA: ICD-10-CM

## 2019-03-14 DIAGNOSIS — M54.2 NECK PAIN: ICD-10-CM

## 2019-03-14 DIAGNOSIS — R55 SYNCOPE, UNSPECIFIED SYNCOPE TYPE: ICD-10-CM

## 2019-03-14 DIAGNOSIS — G89.29 CHRONIC MIDLINE LOW BACK PAIN WITHOUT SCIATICA: ICD-10-CM

## 2019-03-14 PROCEDURE — 99244 OFF/OP CNSLTJ NEW/EST MOD 40: CPT | Performed by: OTHER

## 2019-03-14 RX ORDER — AMITRIPTYLINE HYDROCHLORIDE 25 MG/1
25 TABLET, FILM COATED ORAL NIGHTLY
Qty: 30 TABLET | Refills: 2 | Status: SHIPPED | OUTPATIENT
Start: 2019-03-14 | End: 2019-03-25 | Stop reason: ALTCHOICE

## 2019-03-14 NOTE — PROGRESS NOTES
HPI:    Patient ID: Catalino Miller is a 44year old male. 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  Patient has been getting occipital headaches for p Paternal Grandmother    • Other (Healthy) Sister    • Other (Healthy) Brother    • Other (Healthy) Brother       Social History    Tobacco Use      Smoking status: Former Smoker        Types: Cigarettes        Quit date: 1/1/2001        Years since [facility] nourished  HEENT: Normocephalic and atraumatic. + occipital tenderness on the left  Cardiovascular: Normal rate, regular rhythm and normal heart sounds. Pulmonary/Chest: Effort normal and breath sounds normal.   Abdominal: Soft.  Bowel sounds are normal. month      Thank you for allowing us to participate in your patient's care. Please do not hesitate to call if you have any questions.    Mendel Morin, MD  [facility]        No orders of the defined types were placed in this encounter

## 2019-03-18 ENCOUNTER — TELEPHONE (OUTPATIENT)
Dept: PAIN CLINIC | Facility: CLINIC | Age: 44
End: 2019-03-18

## 2019-03-18 ENCOUNTER — OFFICE VISIT (OUTPATIENT)
Dept: PAIN CLINIC | Facility: CLINIC | Age: 44
End: 2019-03-18
Payer: COMMERCIAL

## 2019-03-18 VITALS
HEIGHT: 69 IN | OXYGEN SATURATION: 97 % | DIASTOLIC BLOOD PRESSURE: 76 MMHG | HEART RATE: 95 BPM | SYSTOLIC BLOOD PRESSURE: 112 MMHG | BODY MASS INDEX: 26.59 KG/M2 | WEIGHT: 179.5 LBS

## 2019-03-18 DIAGNOSIS — M47.812 OSTEOARTHRITIS OF CERVICAL SPINE, UNSPECIFIED SPINAL OSTEOARTHRITIS COMPLICATION STATUS: ICD-10-CM

## 2019-03-18 DIAGNOSIS — M47.812 ARTHROPATHY OF CERVICAL FACET JOINT: Primary | ICD-10-CM

## 2019-03-18 DIAGNOSIS — M25.562 CHRONIC PAIN OF LEFT KNEE: ICD-10-CM

## 2019-03-18 DIAGNOSIS — M54.81 OCCIPITAL NEURALGIA OF LEFT SIDE: ICD-10-CM

## 2019-03-18 DIAGNOSIS — G89.29 CHRONIC PAIN OF LEFT KNEE: ICD-10-CM

## 2019-03-18 PROCEDURE — 99214 OFFICE O/P EST MOD 30 MIN: CPT | Performed by: NURSE PRACTITIONER

## 2019-03-18 RX ORDER — PREGABALIN 25 MG/1
25 CAPSULE ORAL EVERY 12 HOURS
Qty: 60 CAPSULE | Refills: 0 | Status: SHIPPED
Start: 2019-03-18 | End: 2019-04-03 | Stop reason: DRUGHIGH

## 2019-03-18 RX ORDER — BACLOFEN 10 MG/1
10 TABLET ORAL EVERY 8 HOURS PRN
Qty: 60 TABLET | Refills: 0 | Status: SHIPPED | OUTPATIENT
Start: 2019-03-18 | End: 2019-05-22

## 2019-03-18 NOTE — TELEPHONE ENCOUNTER
Please let him know that Dr. Nohemy Valentine recommends that he decrease his amitriptyline to 12.5mg, so he can cut the 25mg tablet in half that he currently has. If his side effects done resolve after a week or two, he can discontinue amitriptyline.  She is ok wi

## 2019-03-18 NOTE — TELEPHONE ENCOUNTER
Medical clearance needed- NO     Pt seen in OV today by ROSE MARY Kern and recommended for GONB (X 1). Please begin PA process for procedure(s).      Laterality: Left  Level(s): Occipital Nerve    Pt informed callback will be given when PA feedback rece

## 2019-03-18 NOTE — PROGRESS NOTES
Spoke with patient and scheduled injections. Reviewed pre-op instructions. Patient verbalized understanding, no further questions at this time. Pre-op instructions provided to patient. Alexandru Solorio

## 2019-03-18 NOTE — PATIENT INSTRUCTIONS
Refill policies:    • Allow 2-3 business days for refills; controlled substances may take longer.   • Contact your pharmacy at least 5 days prior to running out of medication and have them send an electronic request or submit request through the “request re been approved by your insurer. Depending on your insurance carrier, approval may take 3-10 days. It is highly recommended patients contact their insurance carrier directly to determine coverage.   If test is done without insurance authorization, patient ma SEDATION        Appointment Date: 3/25/19   Check-In Time: 7:00am    ** TO AVOID CANCELLATION AND/OR RESCHEDULING: PLEASE CALL [facility] PRE-PROCEDURE LINE -[phone] FOR DETAILED INSTRUCTIONS FIVE TO SEVEN DAYS PRIOR TO PROCEDURE**        Prior to the proc procedure      Medication:   Number of days you need to be off for the following medications:  • Aggrenox 10 days   • Agrylin (Anagrelide) 10 days   • Enbrel (Etanercept) 24 hours   • Fragmin (Dalteparin) 24 hours   • Pletal (Cilostazol) 7 days  • Effient blood sugar. Contact your primary care physician if your blood sugar rises as you may require some medication adjustment.         It is normal to have increased pain at injection site for up to 3-5 days after procedure, you can        use heat or ice (20 m

## 2019-03-18 NOTE — PROGRESS NOTES
Name: Teresa Storey   : 1975   DOS: 3/18/2019     Pain Clinic Follow Up Visit:   Teresa Storey is a 44year old male who presents for recheck of his left-sided chronic neck and occipital pain.      Rates pain today: 9/10 (eight) hours as needed (for muscle spasm). Disp: 60 tablet Rfl: 0   pregabalin 25 MG Oral Cap Take 1 capsule (25 mg total) by mouth Q12H.  Disp: 60 capsule Rfl: 0   Diclofenac Sodium 50 MG Oral Tab EC Take 1 tablet (50 mg total) by mouth every 12 (twelve) methocarbamol. Begin baclofen 5-10mg every 8 hours if needed for muscle spasm. Continue diclofenac EC one tablet every 12 hours if needed for pain.   I have discussed with him that we will plan to defer opioid pain medication and continue to work with non

## 2019-03-19 ENCOUNTER — TELEPHONE (OUTPATIENT)
Dept: PAIN CLINIC | Facility: CLINIC | Age: 44
End: 2019-03-19

## 2019-03-19 DIAGNOSIS — M47.812 ARTHROPATHY OF CERVICAL FACET JOINT: Primary | ICD-10-CM

## 2019-03-19 DIAGNOSIS — M47.812 OSTEOARTHRITIS OF CERVICAL SPINE, UNSPECIFIED SPINAL OSTEOARTHRITIS COMPLICATION STATUS: ICD-10-CM

## 2019-03-19 RX ORDER — GABAPENTIN 100 MG/1
CAPSULE ORAL
Qty: 90 CAPSULE | Refills: 0 | Status: SHIPPED | OUTPATIENT
Start: 2019-03-19 | End: 2019-03-25 | Stop reason: ALTCHOICE

## 2019-03-19 NOTE — TELEPHONE ENCOUNTER
Request received from [employer] for prior authorization of Lyrica 25mg. Referral initiated.

## 2019-03-20 ENCOUNTER — TELEPHONE (OUTPATIENT)
Dept: SURGERY | Facility: CLINIC | Age: 44
End: 2019-03-20

## 2019-03-20 NOTE — TELEPHONE ENCOUNTER
Left message for patient to call back to confirm procedure date of 3/25/19 with Dr Haydee Romo and to be checked in at outpatient registration at 7:00 am. Patient to be instructed to call pre-procedure line before procedure at [phone].  Patient to be instruct

## 2019-03-23 ENCOUNTER — HOSPITAL ENCOUNTER (OUTPATIENT)
Dept: MRI IMAGING | Age: 44
Discharge: HOME | End: 2019-03-23
Attending: NURSE PRACTITIONER
Payer: COMMERCIAL

## 2019-03-23 DIAGNOSIS — G89.29 CHRONIC PAIN OF LEFT KNEE: ICD-10-CM

## 2019-03-23 DIAGNOSIS — R93.89 ABNORMAL X-RAY: ICD-10-CM

## 2019-03-23 DIAGNOSIS — M25.562 CHRONIC PAIN OF LEFT KNEE: ICD-10-CM

## 2019-03-23 PROCEDURE — 73721 MRI JNT OF LWR EXTRE W/O DYE: CPT | Performed by: NURSE PRACTITIONER

## 2019-03-25 ENCOUNTER — HOSPITAL ENCOUNTER (OUTPATIENT)
Facility: HOSPITAL | Age: 44
Setting detail: HOSPITAL OUTPATIENT SURGERY
Discharge: HOME | End: 2019-03-25
Attending: ANESTHESIOLOGY | Admitting: ANESTHESIOLOGY
Payer: COMMERCIAL

## 2019-03-25 ENCOUNTER — PATIENT MESSAGE (OUTPATIENT)
Dept: PAIN CLINIC | Facility: CLINIC | Age: 44
End: 2019-03-25

## 2019-03-25 ENCOUNTER — APPOINTMENT (OUTPATIENT)
Dept: GENERAL RADIOLOGY | Facility: HOSPITAL | Age: 44
End: 2019-03-25
Attending: ANESTHESIOLOGY
Payer: COMMERCIAL

## 2019-03-25 VITALS
RESPIRATION RATE: 16 BRPM | DIASTOLIC BLOOD PRESSURE: 62 MMHG | TEMPERATURE: 98 F | SYSTOLIC BLOOD PRESSURE: 101 MMHG | OXYGEN SATURATION: 99 % | HEART RATE: 64 BPM

## 2019-03-25 DIAGNOSIS — M47.812 OSTEOARTHRITIS OF CERVICAL SPINE, UNSPECIFIED SPINAL OSTEOARTHRITIS COMPLICATION STATUS: ICD-10-CM

## 2019-03-25 DIAGNOSIS — M47.812 ARTHROPATHY OF CERVICAL FACET JOINT: ICD-10-CM

## 2019-03-25 PROCEDURE — 99152 MOD SED SAME PHYS/QHP 5/>YRS: CPT | Performed by: ANESTHESIOLOGY

## 2019-03-25 PROCEDURE — 3E0U33Z INTRODUCTION OF ANTI-INFLAMMATORY INTO JOINTS, PERCUTANEOUS APPROACH: ICD-10-PCS | Performed by: ANESTHESIOLOGY

## 2019-03-25 RX ORDER — MIDAZOLAM HYDROCHLORIDE 1 MG/ML
INJECTION INTRAMUSCULAR; INTRAVENOUS AS NEEDED
Status: DISCONTINUED | OUTPATIENT
Start: 2019-03-25 | End: 2019-03-25

## 2019-03-25 RX ORDER — SODIUM CHLORIDE, SODIUM LACTATE, POTASSIUM CHLORIDE, CALCIUM CHLORIDE 600; 310; 30; 20 MG/100ML; MG/100ML; MG/100ML; MG/100ML
100 INJECTION, SOLUTION INTRAVENOUS CONTINUOUS
Status: DISCONTINUED | OUTPATIENT
Start: 2019-03-25 | End: 2019-03-25

## 2019-03-25 RX ORDER — LIDOCAINE HYDROCHLORIDE 10 MG/ML
INJECTION, SOLUTION EPIDURAL; INFILTRATION; INTRACAUDAL; PERINEURAL AS NEEDED
Status: DISCONTINUED | OUTPATIENT
Start: 2019-03-25 | End: 2019-03-25

## 2019-03-25 RX ORDER — DIPHENHYDRAMINE HYDROCHLORIDE 50 MG/ML
50 INJECTION INTRAMUSCULAR; INTRAVENOUS ONCE AS NEEDED
Status: DISCONTINUED | OUTPATIENT
Start: 2019-03-25 | End: 2019-03-25

## 2019-03-25 RX ORDER — 0.9 % SODIUM CHLORIDE 0.9 %
VIAL (ML) INJECTION AS NEEDED
Status: DISCONTINUED | OUTPATIENT
Start: 2019-03-25 | End: 2019-03-25

## 2019-03-25 RX ORDER — DULOXETIN HYDROCHLORIDE 20 MG/1
20 CAPSULE, DELAYED RELEASE ORAL EVERY 12 HOURS
Qty: 60 CAPSULE | Refills: 0 | Status: SHIPPED | OUTPATIENT
Start: 2019-03-25 | End: 2019-04-10

## 2019-03-25 RX ORDER — DEXAMETHASONE SODIUM PHOSPHATE 10 MG/ML
INJECTION, SOLUTION INTRAMUSCULAR; INTRAVENOUS AS NEEDED
Status: DISCONTINUED | OUTPATIENT
Start: 2019-03-25 | End: 2019-03-25

## 2019-03-25 RX ORDER — ONDANSETRON 2 MG/ML
4 INJECTION INTRAMUSCULAR; INTRAVENOUS ONCE AS NEEDED
Status: DISCONTINUED | OUTPATIENT
Start: 2019-03-25 | End: 2019-03-25

## 2019-03-25 NOTE — TELEPHONE ENCOUNTER
Spoke with Juvencio Kilgore at [place] [address]  Case #:[id number]  Time:14:57    Per Juvencio Kilgore Clinical Information needs to be faxed for medical review.    Fax #:[phone] -Clinicals Faxed

## 2019-03-25 NOTE — OPERATIVE REPORT
[facility]  Operative Report  3/25/2019     Kwabena Gloria Patient Status:  Hospital Outpatient Surgery    1975 MRN [id number]   [facility] ENDOSCOPY Attending Leoncio Padron MD   Hosp Day # 0 PCP Malgorzata Irby MD     In position a 22-gauge, 3-1/2-inch spinal needle adjacent to the mid portion of the corresponding articular pillar at C4. The needle position was confirmed in AP and lateral views.   Following negative aspiration for CSF and blood, 0.5 mL 1% lidocaine and 2.5

## 2019-03-25 NOTE — H&P
History & Physical Examination    Patient Name: Vianney Ramirez  MRN: [id number]  [facility]: [id number]  YOB: 1975    Pre-Operative Diagnosis:  Arthropathy of cervical facet joint [M47.812]  Osteoarthritis of cervical spine, unspecified s Tobacco Use      Smoking status: Former Smoker        Types: Cigarettes        Quit date: 2001        Years since quittin.2      Smokeless tobacco: Never Used    Alcohol use:  Yes      Alcohol/week: 2.4 oz      Types: 4 Cans of beer per week      C

## 2019-03-26 ENCOUNTER — PATIENT MESSAGE (OUTPATIENT)
Dept: PAIN CLINIC | Facility: CLINIC | Age: 44
End: 2019-03-26

## 2019-03-26 DIAGNOSIS — M25.561 RIGHT KNEE PAIN, UNSPECIFIED CHRONICITY: ICD-10-CM

## 2019-03-26 DIAGNOSIS — M17.12 OSTEOARTHRITIS OF LEFT KNEE, UNSPECIFIED OSTEOARTHRITIS TYPE: Primary | ICD-10-CM

## 2019-03-26 NOTE — TELEPHONE ENCOUNTER
Dr. En Nails recommends left knee steroid injection, I have placed the case, please call patient to schedule.    Thanks

## 2019-03-26 NOTE — TELEPHONE ENCOUNTER
Received Authorization Fax for Lyrica from [place] [employer] #:[id number]  Valid Dates:3/25/19 thru 03/25/2020

## 2019-03-26 NOTE — TELEPHONE ENCOUNTER
From: Guillaume Zambrano  To: ROSE MARY Polk  Sent: 3/26/2019 12:17 PM CDT  Subject: Prescription Question    Good afternoon Lisa. I got the Lyrica yesterday and took it, so you want me to take some other medication with it?  Also I had my inject

## 2019-03-28 ENCOUNTER — TELEPHONE (OUTPATIENT)
Dept: PAIN CLINIC | Facility: CLINIC | Age: 44
End: 2019-03-28

## 2019-03-28 DIAGNOSIS — M25.561 RIGHT KNEE PAIN, UNSPECIFIED CHRONICITY: Primary | ICD-10-CM

## 2019-03-28 DIAGNOSIS — M17.12 OSTEOARTHRITIS OF LEFT KNEE, UNSPECIFIED OSTEOARTHRITIS TYPE: Primary | ICD-10-CM

## 2019-03-28 NOTE — TELEPHONE ENCOUNTER
Medical clearance needed- No    Pt evaluated by Dr. Jeffrey Reyes and recommended for Left Knee Steroid Injection. Please begin PA process for procedure(s).      Laterality: Left  Level(s): N/A    Pt informed callback will be given when PA feedback received and pro

## 2019-03-29 NOTE — TELEPHONE ENCOUNTER
Spoke with patient and informed patient of message below. Central scheduling number provided. Encouraged pt to wait a few days prior to scheduling for authorization. Pt verbalized understanding. No further questions at this time.

## 2019-03-29 NOTE — TELEPHONE ENCOUNTER
[facility] showing patient terms as of April 1 will have to pa next week to check if patient has coverage

## 2019-04-02 ENCOUNTER — PATIENT MESSAGE (OUTPATIENT)
Dept: PAIN CLINIC | Facility: CLINIC | Age: 44
End: 2019-04-02

## 2019-04-02 ENCOUNTER — HOSPITAL ENCOUNTER (OUTPATIENT)
Dept: GENERAL RADIOLOGY | Age: 44
Discharge: HOME | End: 2019-04-02
Attending: NURSE PRACTITIONER
Payer: COMMERCIAL

## 2019-04-02 DIAGNOSIS — M25.561 RIGHT KNEE PAIN, UNSPECIFIED CHRONICITY: ICD-10-CM

## 2019-04-02 DIAGNOSIS — M25.561 RIGHT KNEE PAIN, UNSPECIFIED CHRONICITY: Primary | ICD-10-CM

## 2019-04-02 PROCEDURE — 73560 X-RAY EXAM OF KNEE 1 OR 2: CPT | Performed by: NURSE PRACTITIONER

## 2019-04-02 NOTE — TELEPHONE ENCOUNTER
From: Fleta Meckel  To: ROSE MARY Zhou  Sent: 4/2/2019 1:10 PM CDT  Subject: Prescription Question    Hi Lisa. The Gabapentine I only have a 2 pills to go and after that I won't take it anymore.  The Tramadol which I use when I have bad hea

## 2019-04-02 NOTE — TELEPHONE ENCOUNTER
Called and scheduled procedure. Reviewed below instructions including fasting, holding NSAID, and need for . Patient does not take any BP/DM meds. Patient verbalized understanding.     [address]  PRE-PROCEDURE INSTRUCTIONS WITH IV SE ? Please note-No prescriptions will be written by Pain Clinic in [place] on the day of procedure. If you require a refill of medications, please contact the office 48 hours prior to your procedure.   ? If you have an implanted Spinal Cord or Peripheral Nerve Sti Please schedule a follow up visit within 2 to 4 weeks after your last procedure date   Please call our office with any questions or concerns before or after your procedure at [phone].   If you are a diabetic, please increase the frequency of your

## 2019-04-02 NOTE — TELEPHONE ENCOUNTER
Started PA injection for knee 20610 online [facility]  Immediate response     Notification or Prior Authorization is not required for the requested services    Decision ID #:[id number]    The number above acknowledges your inquiry and our response.  Please write t

## 2019-04-03 ENCOUNTER — TELEPHONE (OUTPATIENT)
Dept: SURGERY | Facility: CLINIC | Age: 44
End: 2019-04-03

## 2019-04-03 RX ORDER — PREGABALIN 50 MG/1
50 CAPSULE ORAL EVERY 12 HOURS
Qty: 60 CAPSULE | Refills: 0 | Status: SHIPPED
Start: 2019-04-03 | End: 2019-05-22

## 2019-04-03 NOTE — TELEPHONE ENCOUNTER
Spoke to patient, confirmed procedure date of 4/8/19 and to be checked in at outpatient registration at 8:15 am. Patient called pre-procedure line and understood instructions. Patient instructed to call office if there are additional questions .

## 2019-04-04 ENCOUNTER — TELEPHONE (OUTPATIENT)
Dept: SURGERY | Facility: CLINIC | Age: 44
End: 2019-04-04

## 2019-04-08 ENCOUNTER — APPOINTMENT (OUTPATIENT)
Dept: GENERAL RADIOLOGY | Facility: HOSPITAL | Age: 44
End: 2019-04-08
Attending: ANESTHESIOLOGY
Payer: COMMERCIAL

## 2019-04-08 ENCOUNTER — HOSPITAL ENCOUNTER (OUTPATIENT)
Facility: HOSPITAL | Age: 44
Setting detail: HOSPITAL OUTPATIENT SURGERY
Discharge: HOME | End: 2019-04-08
Attending: ANESTHESIOLOGY | Admitting: ANESTHESIOLOGY
Payer: COMMERCIAL

## 2019-04-08 VITALS
RESPIRATION RATE: 18 BRPM | DIASTOLIC BLOOD PRESSURE: 69 MMHG | TEMPERATURE: 98 F | HEART RATE: 64 BPM | OXYGEN SATURATION: 100 % | SYSTOLIC BLOOD PRESSURE: 99 MMHG

## 2019-04-08 DIAGNOSIS — M17.12 OSTEOARTHRITIS OF LEFT KNEE, UNSPECIFIED OSTEOARTHRITIS TYPE: ICD-10-CM

## 2019-04-08 PROCEDURE — 3E0U33Z INTRODUCTION OF ANTI-INFLAMMATORY INTO JOINTS, PERCUTANEOUS APPROACH: ICD-10-PCS | Performed by: ANESTHESIOLOGY

## 2019-04-08 PROCEDURE — 77002 NEEDLE LOCALIZATION BY XRAY: CPT | Performed by: ANESTHESIOLOGY

## 2019-04-08 PROCEDURE — 99152 MOD SED SAME PHYS/QHP 5/>YRS: CPT | Performed by: ANESTHESIOLOGY

## 2019-04-08 RX ORDER — DIPHENHYDRAMINE HYDROCHLORIDE 50 MG/ML
50 INJECTION INTRAMUSCULAR; INTRAVENOUS ONCE AS NEEDED
Status: DISCONTINUED | OUTPATIENT
Start: 2019-04-08 | End: 2019-04-08

## 2019-04-08 RX ORDER — SODIUM CHLORIDE, SODIUM LACTATE, POTASSIUM CHLORIDE, CALCIUM CHLORIDE 600; 310; 30; 20 MG/100ML; MG/100ML; MG/100ML; MG/100ML
100 INJECTION, SOLUTION INTRAVENOUS CONTINUOUS
Status: DISCONTINUED | OUTPATIENT
Start: 2019-04-08 | End: 2019-04-08

## 2019-04-08 RX ORDER — LIDOCAINE HYDROCHLORIDE 10 MG/ML
INJECTION, SOLUTION EPIDURAL; INFILTRATION; INTRACAUDAL; PERINEURAL AS NEEDED
Status: DISCONTINUED | OUTPATIENT
Start: 2019-04-08 | End: 2019-04-08

## 2019-04-08 RX ORDER — ONDANSETRON 2 MG/ML
4 INJECTION INTRAMUSCULAR; INTRAVENOUS ONCE AS NEEDED
Status: DISCONTINUED | OUTPATIENT
Start: 2019-04-08 | End: 2019-04-08

## 2019-04-08 RX ORDER — METHYLPREDNISOLONE ACETATE 40 MG/ML
INJECTION, SUSPENSION INTRA-ARTICULAR; INTRALESIONAL; INTRAMUSCULAR; SOFT TISSUE AS NEEDED
Status: DISCONTINUED | OUTPATIENT
Start: 2019-04-08 | End: 2019-04-08

## 2019-04-08 RX ORDER — MIDAZOLAM HYDROCHLORIDE 1 MG/ML
INJECTION INTRAMUSCULAR; INTRAVENOUS AS NEEDED
Status: DISCONTINUED | OUTPATIENT
Start: 2019-04-08 | End: 2019-04-08

## 2019-04-08 RX ORDER — 0.9 % SODIUM CHLORIDE 0.9 %
VIAL (ML) INJECTION AS NEEDED
Status: DISCONTINUED | OUTPATIENT
Start: 2019-04-08 | End: 2019-04-08

## 2019-04-08 NOTE — H&P
History & Physical Examination    Patient Name: Philly Willard  MRN: [id number]  CSN: [id number]  YOB: 1975    Pre-Operative Diagnosis:  Osteoarthritis of left knee, unspecified osteoarthritis type [M17.12]    Present Illness: Christine Types: Cigarettes        Quit date: 2001        Years since quittin.2      Smokeless tobacco: Never Used    Alcohol use:  Yes      Alcohol/week: 2.4 oz      Types: 4 Cans of beer per week      Comment: occ/social      SYSTEM Check if Review is Norm

## 2019-04-08 NOTE — OPERATIVE REPORT
[facility]  Operative Report  2019     Filomena Gloria Patient Status:  Hospital Outpatient Surgery    1975 MRN [id number]   [facility] ENDOSCOPY Attending Chito Pacheco MD   Hosp Day # 0 PCP Easton Cruz MD     Ind injected after repeated aspiration. The patient tolerated the procedure very well. The patient has complete understanding of the risks and benefits of the procedure. Complications: None. Follow up:  In clinic as needed        Socrates Frazier MD

## 2019-04-09 ENCOUNTER — TELEPHONE (OUTPATIENT)
Dept: SURGERY | Facility: CLINIC | Age: 44
End: 2019-04-09

## 2019-04-09 NOTE — TELEPHONE ENCOUNTER
Spoke with patient and relayed results and recommendations below. Patient agrees to proceed with procedure in office without sedation.      Need PA for Right knee joint steroid injection with aspiration of Baker's cyst

## 2019-04-09 NOTE — TELEPHONE ENCOUNTER
There are degenerative changes in the right knee as well as a deterioration of cartilage.  There is also a small leaking Au's cyst. Dr. Betsy Melendez has reviewed MRI right knee report and recommends right knee joint steroid injection with aspiration of Baker's c

## 2019-04-10 ENCOUNTER — LAB ENCOUNTER (OUTPATIENT)
Dept: LAB | Age: 44
End: 2019-04-10
Attending: EMERGENCY RESPONSE ATTENDANT
Payer: COMMERCIAL

## 2019-04-10 ENCOUNTER — OFFICE VISIT (OUTPATIENT)
Dept: INTERNAL MEDICINE CLINIC | Facility: CLINIC | Age: 44
End: 2019-04-10
Payer: COMMERCIAL

## 2019-04-10 VITALS
WEIGHT: 176.5 LBS | RESPIRATION RATE: 16 BRPM | OXYGEN SATURATION: 98 % | HEART RATE: 76 BPM | TEMPERATURE: 98 F | HEIGHT: 69 IN | SYSTOLIC BLOOD PRESSURE: 122 MMHG | DIASTOLIC BLOOD PRESSURE: 64 MMHG | BODY MASS INDEX: 26.14 KG/M2

## 2019-04-10 DIAGNOSIS — Z11.3 SCREEN FOR STD (SEXUALLY TRANSMITTED DISEASE): ICD-10-CM

## 2019-04-10 DIAGNOSIS — Z11.3 SCREEN FOR STD (SEXUALLY TRANSMITTED DISEASE): Primary | ICD-10-CM

## 2019-04-10 PROCEDURE — 99213 OFFICE O/P EST LOW 20 MIN: CPT | Performed by: PHYSICIAN ASSISTANT

## 2019-04-10 PROCEDURE — 86592 SYPHILIS TEST NON-TREP QUAL: CPT

## 2019-04-10 PROCEDURE — 87389 HIV-1 AG W/HIV-1&-2 AB AG IA: CPT

## 2019-04-10 PROCEDURE — 87591 N.GONORRHOEAE DNA AMP PROB: CPT

## 2019-04-10 PROCEDURE — 80074 ACUTE HEPATITIS PANEL: CPT

## 2019-04-10 PROCEDURE — 87491 CHLMYD TRACH DNA AMP PROBE: CPT

## 2019-04-10 PROCEDURE — 36415 COLL VENOUS BLD VENIPUNCTURE: CPT

## 2019-04-10 NOTE — PROGRESS NOTES
Reed Phipps is a 44year old male. HPI:   Patient presents requesting STD testing. Asymptomatic. Sexually active with one male partner (). Not using condoms. Had L knee injection two days ago.   No improvement in pain thus fa developed, well nourished, in no acute distress  SKIN: no rashes, no suspicious lesions  LUNGS: regular breathing rate and effort, clear to auscultation bilaterally  CARDIO: RRR, normal S1 & S2, no murmur  GI: soft, non-tender, non-distended, no guarding o

## 2019-04-10 NOTE — TELEPHONE ENCOUNTER
Called patient and scheduled OV. Reviewed instructions below including to hold NSAIDs.     Mike  PRE-PROCEDURE INSTRUCTIONS FOR OFFICE PROCEDURES    Appointment Date: 4/16/19  Appointment Time: 2:15 pm  Physician: Reynaldo Li    ** TO LIAN date.   Please contact your insurance carrier to determine what your financial  responsibility will be for the procedure(s).     Cancellation/Rescheduling Appointment:   In the event you need to cancel or reschedule your appointment, you must notify the off

## 2019-04-10 NOTE — TELEPHONE ENCOUNTER
Jeannine OCTE for 2061 knee injection online [facility]   Immediate response :    Notification or Prior Authorization is not required for the requested services    Decision ID #:[id number]

## 2019-04-11 ENCOUNTER — OFFICE VISIT (OUTPATIENT)
Dept: PAIN CLINIC | Facility: CLINIC | Age: 44
End: 2019-04-11
Payer: COMMERCIAL

## 2019-04-11 ENCOUNTER — TELEPHONE (OUTPATIENT)
Dept: PAIN CLINIC | Facility: CLINIC | Age: 44
End: 2019-04-11

## 2019-04-11 VITALS
HEART RATE: 78 BPM | HEIGHT: 69 IN | WEIGHT: 176.5 LBS | OXYGEN SATURATION: 98 % | SYSTOLIC BLOOD PRESSURE: 116 MMHG | DIASTOLIC BLOOD PRESSURE: 78 MMHG | BODY MASS INDEX: 26.14 KG/M2

## 2019-04-11 DIAGNOSIS — R51.9 HEADACHE DISORDER: ICD-10-CM

## 2019-04-11 DIAGNOSIS — M47.812 ARTHROPATHY OF CERVICAL FACET JOINT: ICD-10-CM

## 2019-04-11 DIAGNOSIS — M17.0 PRIMARY OSTEOARTHRITIS OF BOTH KNEES: Primary | ICD-10-CM

## 2019-04-11 DIAGNOSIS — M25.562 CHRONIC PAIN OF LEFT KNEE: ICD-10-CM

## 2019-04-11 DIAGNOSIS — G89.29 CHRONIC PAIN OF LEFT KNEE: ICD-10-CM

## 2019-04-11 DIAGNOSIS — M54.12 CERVICAL RADICULITIS: Primary | ICD-10-CM

## 2019-04-11 PROCEDURE — 99214 OFFICE O/P EST MOD 30 MIN: CPT | Performed by: ANESTHESIOLOGY

## 2019-04-11 NOTE — PROGRESS NOTES
Name: Candance Cannon   : 1975   DOS: 2019     Pain Clinic Follow Up Visit:   Patient presents with:   Follow - Up: left cervical facet relief reported: 40% 2days post injection until last Friday 9/10, left knee relief reported: 30% disease.     ASSESSMENT AND PLAN:   Primary osteoarthritis of both knees  (primary encounter diagnosis)  Headache disorder  Chronic pain of left knee  Arthropathy of cervical facet joint    The patient is a 64-year-old gentleman with a history of multiple p

## 2019-04-11 NOTE — TELEPHONE ENCOUNTER
Medical clearance needed- no    Pt seen in OV today by Dr. Aime Talbert and recommended for facets (X 2). Please begin PA process for procedure(s).      Laterality: left then right  Level(s): C3-7    Pt informed callback will be given when PA feedback received and

## 2019-04-11 NOTE — PATIENT INSTRUCTIONS
Refill policies:    • Allow 2-3 business days for refills; controlled substances may take longer.   • Contact your pharmacy at least 5 days prior to running out of medication and have them send an electronic request or submit request through the “request re been approved by your insurer. Depending on your insurance carrier, approval may take 3-10 days. It is highly recommended patients contact their insurance carrier directly to determine coverage.   If test is done without insurance authorization, patient ma SEDATION      ** TO AVOID CANCELLATION AND/OR RESCHEDULING: PLEASE CALL [facility] PRE-PROCEDURE LINE [phone] FOR DETAILED INSTRUCTIONS FIVE TO SEVEN DAYS PRIOR TO PROCEDURE**        Prior to the procedure:  Please update us prior to the procedure if you the following medications:  • Aggrenox 10 days   • Agrylin (Anagrelide) 10 days   • Enbrel (Etanercept) 24 hours   • Fragmin (Dalteparin) 24 hours   • Pletal (Cilostazol) 7 days  • Effient (Prasugrel) 7 days  • Pradaxa 10 days  • Trental 7 days  • Eliquis rises as you may require some medication adjustment. It is normal to have increased pain at injection site for up to 3-5 days after procedure, you can        use heat or ice (20 minutes on 20 minutes off) for comfort.     ** TO AVOID CANCELLATION AND Once numbed, placing the injection needle often feels like more of a strong pressure and pinching, often not as sharp pain. Will I be \"put out\" for the facet joint injection? No, this procedure is done with a local anesthetic.  Some patients choose to recommended. Can I have more than three facet joint injections? Most patients do not receive more than three injections in a six-month period. This is because the effect of the medication injected frequently lasts for six months or more.  If three inject

## 2019-04-11 NOTE — TELEPHONE ENCOUNTER
Started PA online [facility] for cervical facet [phone]  Immediate response     Notification or Prior Authorization is not required for the requested services    Decision ID #:[id number]    The number above acknowledges your inquiry and our response.  Pl

## 2019-04-11 NOTE — PROGRESS NOTES
Spoke with patient. Reviewed pre-op instructions. Patient verbalized understanding, no further questions at this time. Pre-op instructions given to patient.     [address]  PRE-PROCEDURE INSTRUCTIONS WITH IV SEDATION      ** TO AVOID CANCEL ? Please note-No prescriptions will be written by Pain Clinic in [place] on the day of procedure. If you require a refill of medications, please contact the office 48 hours prior to your procedure.   ? If you have an implanted Spinal Cord or Peripheral Nerve Sti Please schedule a follow up visit within 2 to 4 weeks after your last procedure date   Please call our office with any questions or concerns before or after your procedure at [phone].   If you are a diabetic, please increase the frequency of your The long acting anti-inflammatory medication, or steroid, that is injected reduces inflammation and swelling of the facet joint and other tissues surrounding the joint.  This may reduce pain, tingling and numbness and other symptoms caused by such inflammat Can I go back to work the next day? You should be able to go back to work the next day. Again it is normal to feel some soreness or aching at the injection site.    How long does the effect of the medication last?  The steroid starts working in about 3 to ? Increased heart rate   ? Abdominal cramping or bloating   You should contact our office immediately if you experience any side effects. These effects usually resolve within a few days.     January 2016

## 2019-04-12 NOTE — TELEPHONE ENCOUNTER
Provider would like to switch procedures to RFAs.     Please request prior auth for     RFA left C3-7  RFA right C3-7

## 2019-04-12 NOTE — TELEPHONE ENCOUNTER
Called patient and scheduled procedures. Instructions below reviewed including fasting, NSAID hold, and need for . Patient verbalized understanding.     [address]  PRE-PROCEDURE INSTRUCTIONS WITH IV SEDATION        Appointment Date: ? Please note-No prescriptions will be written by Pain Clinic in [place] on the day of procedure. If you require a refill of medications, please contact the office 48 hours prior to your procedure.   ? If you have an implanted Spinal Cord or Peripheral Nerve Sti Please schedule a follow up visit within 2 to 4 weeks after your last procedure date   Please call our office with any questions or concerns before or after your procedure at [phone].   If you are a diabetic, please increase the frequency of your The long acting anti-inflammatory medication, or steroid, that is injected reduces inflammation and swelling of the facet joint and other tissues surrounding the joint.  This may reduce pain, tingling and numbness and other symptoms caused by such inflammat Can I go back to work the next day? You should be able to go back to work the next day. Again it is normal to feel some soreness or aching at the injection site.    How long does the effect of the medication last?  The steroid starts working in about 3 to ? Increased heart rate   ? Abdominal cramping or bloating   You should contact our office immediately if you experience any side effects. These effects usually resolve within a few days.     January 2016

## 2019-04-16 ENCOUNTER — OFFICE VISIT (OUTPATIENT)
Dept: PAIN CLINIC | Facility: CLINIC | Age: 44
End: 2019-04-16
Payer: COMMERCIAL

## 2019-04-16 VITALS
SYSTOLIC BLOOD PRESSURE: 120 MMHG | WEIGHT: 176.5 LBS | DIASTOLIC BLOOD PRESSURE: 72 MMHG | BODY MASS INDEX: 26.14 KG/M2 | HEIGHT: 69 IN

## 2019-04-16 DIAGNOSIS — M25.561 CHRONIC PAIN OF RIGHT KNEE: ICD-10-CM

## 2019-04-16 DIAGNOSIS — M17.0 PRIMARY OSTEOARTHRITIS OF BOTH KNEES: Primary | ICD-10-CM

## 2019-04-16 DIAGNOSIS — M71.21 SYNOVIAL CYST OF RIGHT POPLITEAL SPACE: ICD-10-CM

## 2019-04-16 DIAGNOSIS — G89.29 CHRONIC PAIN OF RIGHT KNEE: ICD-10-CM

## 2019-04-16 PROCEDURE — 76942 ECHO GUIDE FOR BIOPSY: CPT | Performed by: ANESTHESIOLOGY

## 2019-04-16 PROCEDURE — 20600 DRAIN/INJ JOINT/BURSA W/O US: CPT | Performed by: ANESTHESIOLOGY

## 2019-04-16 PROCEDURE — 20605 DRAIN/INJ JOINT/BURSA W/O US: CPT | Performed by: ANESTHESIOLOGY

## 2019-04-16 RX ORDER — LIDOCAINE HYDROCHLORIDE 10 MG/ML
10 INJECTION, SOLUTION INFILTRATION; PERINEURAL ONCE
Status: COMPLETED | OUTPATIENT
Start: 2019-04-16 | End: 2019-04-16

## 2019-04-16 RX ORDER — BUPIVACAINE HYDROCHLORIDE 5 MG/ML
9 INJECTION, SOLUTION PERINEURAL ONCE
Status: COMPLETED | OUTPATIENT
Start: 2019-04-16 | End: 2019-04-16

## 2019-04-16 RX ORDER — METHYLPREDNISOLONE ACETATE 40 MG/ML
40 INJECTION, SUSPENSION INTRA-ARTICULAR; INTRALESIONAL; INTRAMUSCULAR; SOFT TISSUE ONCE
Status: COMPLETED | OUTPATIENT
Start: 2019-04-16 | End: 2019-04-16

## 2019-04-16 NOTE — PROGRESS NOTES
Timeout completed prior to procedure @ 7393. Participants present for timeout:  Dr. Aime Talbert, RN Olivia Nickerson, and patient.

## 2019-04-16 NOTE — PROCEDURES
Date of procedure: April 16, 2019    Preop diagnosis: Osteoarthritis of the right knee, synovial cyst in popliteal space right knee    Postop diagnosis: Same    Procedure: 1.  Right knee intra-articular injection                   2.  Right knee Baker's cys

## 2019-04-18 NOTE — TELEPHONE ENCOUNTER
Called and notified patient. 5/15/19 hold removed. Advised will call patient to schedule once right side is approved.     [address]  PRE-PROCEDURE INSTRUCTIONS WITH IV SEDATION        Appointment Date: 5/1/19   Check-In Time: 9:00 am    ** ? Please note-No prescriptions will be written by Pain Clinic in [place] on the day of procedure. If you require a refill of medications, please contact the office 48 hours prior to your procedure.   ? If you have an implanted Spinal Cord or Peripheral Nerve Sti Please schedule a follow up visit within 2 to 4 weeks after your last procedure date   Please call our office with any questions or concerns before or after your procedure at [phone].   If you are a diabetic, please increase the frequency of your How is radiofrequency ablation actually performed? The skin on the back or neck is cleaned with antiseptic solution and then the procedure is carried out. The skin is numbed with a local anesthetic.  Then X-ray or fluoroscopy is used to guide placement o No. This procedure is done under local anesthesia. Most of the patients also receive intravenous sedation, which makes the procedure easier to tolerate.  It is necessary for you to be awake enough to communicate easily with the physician during the procedur It is sometimes difficult to predict if the radiofrequency ablation will indeed help you or not.  Generally speaking, the patients who have responded well to trial blocks will have better results than those who responded less well from diagnostic or trial i

## 2019-04-18 NOTE — TELEPHONE ENCOUNTER
Prior authorization request completed for: RFA Cervical  Authorization #[id number]  Authorization dates: 4/22/19  CPT codes approved: 29628, 60186  Number of visits/dates of service approved: 1  Physician: Andressa Manrique    *authorization is date specific, please no

## 2019-04-18 NOTE — TELEPHONE ENCOUNTER
Spoke with Cecillia Printers at [facility]  Reference #:[id number]   Time:8:29    Updated DOS to 5/01/19

## 2019-04-26 ENCOUNTER — TELEPHONE (OUTPATIENT)
Dept: SURGERY | Facility: CLINIC | Age: 44
End: 2019-04-26

## 2019-04-26 NOTE — TELEPHONE ENCOUNTER
Spoke to patient, confirmed procedure date of 05/01 and to be checked in at outpatient registration at 9:00 am. Patient instructed to call pre-procedure line before procedure at [phone].  Patient instructed to call office if there are additional questi

## 2019-04-30 ENCOUNTER — PATIENT MESSAGE (OUTPATIENT)
Dept: PAIN CLINIC | Facility: CLINIC | Age: 44
End: 2019-04-30

## 2019-05-01 ENCOUNTER — PATIENT MESSAGE (OUTPATIENT)
Dept: PAIN CLINIC | Facility: CLINIC | Age: 44
End: 2019-05-01

## 2019-05-01 NOTE — TELEPHONE ENCOUNTER
From: Nishant Gloria  To: Nino Weiss MD  Sent: 4/30/2019 6:37 PM CDT  Subject: Visit Follow-up Question    Good afternoon Doctor Gifty Nicholson. I have to cancel my procedure for tomorrow, I call to cancel but scheduling department left for the day.  I

## 2019-05-01 NOTE — TELEPHONE ENCOUNTER
From: Philly Willard  To: ROSE MARY New  Sent: 5/1/2019 7:47 AM CDT  Subject: Other    Any good I'm trying to cancel my procedure for today with doctor Teddy Jaime but I can get through the right person.  An you please advise the phone number we'r

## 2019-05-20 ENCOUNTER — PATIENT MESSAGE (OUTPATIENT)
Dept: PAIN CLINIC | Facility: CLINIC | Age: 44
End: 2019-05-20

## 2019-05-20 DIAGNOSIS — G89.29 CHRONIC PAIN OF LEFT KNEE: Primary | ICD-10-CM

## 2019-05-20 DIAGNOSIS — M25.562 CHRONIC PAIN OF LEFT KNEE: Primary | ICD-10-CM

## 2019-05-20 DIAGNOSIS — M54.12 CERVICAL RADICULOPATHY: ICD-10-CM

## 2019-05-20 DIAGNOSIS — M25.561 CHRONIC PAIN OF RIGHT KNEE: ICD-10-CM

## 2019-05-20 DIAGNOSIS — G89.29 CHRONIC PAIN OF RIGHT KNEE: ICD-10-CM

## 2019-05-20 DIAGNOSIS — M17.0 PRIMARY OSTEOARTHRITIS OF BOTH KNEES: ICD-10-CM

## 2019-05-21 NOTE — PROGRESS NOTES
Medication: Diclofenac Sodium 50 MG Oral Tab EC    Date of last refill: 3/18/19  [place]    Last office visit: 4/11/19  Due back to clinic per last office note:  Not indicated  Date next office visit scheduled:  None consultations:ORTHOPEDIC - EXTERNAL  OP REFERRAL TO NEUROLOGY  The patient indicates understanding of these issues and agrees to the plan.   No follow-ups on file.  Christianne Chris MD, 4/11/2019, 12:15 PM

## 2019-05-21 NOTE — TELEPHONE ENCOUNTER
From: Grecia Clayton  To: ROSE MARY Devlin  Sent: 5/20/2019 6:45 PM CDT  Subject: Prescription Question    Any can you please also refill my prescriptions so I can deal with the pain momentarily. Thank you.

## 2019-05-21 NOTE — TELEPHONE ENCOUNTER
From: Grecia Clayton  To: ROSE MARY Devlin  Sent: 5/20/2019 6:41 PM CDT  Subject: Visit Follow-up Question    Rolo Gonzalez good afternoon can you schedule my nerve blockers on my neck.  I had to cancel them the last time and they pain is severely

## 2019-05-22 RX ORDER — PREGABALIN 50 MG/1
50 CAPSULE ORAL EVERY 12 HOURS
Qty: 60 CAPSULE | Refills: 0 | Status: SHIPPED
Start: 2019-05-22 | End: 2019-06-11 | Stop reason: DRUGHIGH

## 2019-05-22 RX ORDER — BACLOFEN 10 MG/1
10 TABLET ORAL EVERY 8 HOURS PRN
Qty: 60 TABLET | Refills: 0 | Status: CANCELLED | OUTPATIENT
Start: 2019-05-22 | End: 2019-06-21

## 2019-05-22 RX ORDER — PREGABALIN 50 MG/1
50 CAPSULE ORAL EVERY 12 HOURS
Qty: 60 CAPSULE | Refills: 0 | Status: CANCELLED | OUTPATIENT
Start: 2019-05-22 | End: 2019-06-21

## 2019-05-22 RX ORDER — BACLOFEN 10 MG/1
10 TABLET ORAL EVERY 12 HOURS PRN
Qty: 60 TABLET | Refills: 0 | Status: SHIPPED | OUTPATIENT
Start: 2019-05-22 | End: 2019-06-16

## 2019-05-22 NOTE — TELEPHONE ENCOUNTER
Called patient and rescheduled procedure. Instructions below reviewed including fasting, NSAID hold and need for . Patient verbalized understanding.     [address]  PRE-PROCEDURE INSTRUCTIONS WITH IV SEDATION        Appointment Date: ? Please note-No prescriptions will be written by Pain Clinic in [place] on the day of procedure. If you require a refill of medications, please contact the office 48 hours prior to your procedure.   ? If you have an implanted Spinal Cord or Peripheral Nerve Sti Please schedule a follow up visit within 2 to 4 weeks after your last procedure date   Please call our office with any questions or concerns before or after your procedure at [phone].   If you are a diabetic, please increase the frequency of your How is radiofrequency ablation actually performed? The skin on the back or neck is cleaned with antiseptic solution and then the procedure is carried out. The skin is numbed with a local anesthetic.  Then X-ray or fluoroscopy is used to guide placement o No. This procedure is done under local anesthesia. Most of the patients also receive intravenous sedation, which makes the procedure easier to tolerate.  It is necessary for you to be awake enough to communicate easily with the physician during the procedur It is sometimes difficult to predict if the radiofrequency ablation will indeed help you or not.  Generally speaking, the patients who have responded well to trial blocks will have better results than those who responded less well from diagnostic or trial i

## 2019-05-22 NOTE — PROGRESS NOTES
Spoke with patient and he requested these additional refills.     Medication: pregabalin 50 MG and baclofen 10 MG    Date of last refill: pregabalin 50 MG- 4/03/19 and baclofen 10 MG- 3/18/19  [place]  [id number]-3/79/87 achieve the level pain management expectation that he has.   The patient voiced good understanding.           Radiology orders and consultations:ORTHOPEDIC - EXTERNAL  OP REFERRAL TO NEUROLOGY  The patient indicates understanding of these issues and agrees

## 2019-05-28 NOTE — TELEPHONE ENCOUNTER
Prior authorization request for RFA Right Cervical ([id number], [id number]) initiated with [facility], pending reference [id number]. Clinical notes submitted by fax to [phone].

## 2019-05-31 ENCOUNTER — TELEPHONE (OUTPATIENT)
Dept: SURGERY | Facility: CLINIC | Age: 44
End: 2019-05-31

## 2019-05-31 NOTE — TELEPHONE ENCOUNTER
Spoke to patient, confirmed procedure date of 6/5/19 and to be checked in at outpatient registration at 8:00 am. Patient called pre-procedure line and understood instructions. Patient instructed to call office if there are additional questions .

## 2019-06-04 NOTE — TELEPHONE ENCOUNTER
[facility] called asking if reason RFA is done @ hospital is due to lack of equipment for procedure; verified w/RN that this is the case, pt will need sedation & xrays; [facility] approved procedure, ref # [id number]

## 2019-06-04 NOTE — TELEPHONE ENCOUNTER
Prior authorization request completed for: RFA Right Cervical   Authorization #[id number]    Authorization dates: 06/04/19  CPT codes approved: 15804,12926  Number of visits/dates of service approved: 1  Physician: Juana Han     Patient can be scheduled

## 2019-06-04 NOTE — TELEPHONE ENCOUNTER
Spoke to Ramirez Alexander at [facility], authorization #[id number] for RFA Right Cervical has been updated for 6/12/19 DOS.

## 2019-06-04 NOTE — TELEPHONE ENCOUNTER
Called patient and scheduled procedure. Instructions below reviewed including fastin, NSAID hold and need for . Patient verbalized understanding.      [address]  PRE-PROCEDURE INSTRUCTIONS WITH IV SEDATION        Appointment Date: 6/ ? Please note-No prescriptions will be written by Pain Clinic in [place] on the day of procedure. If you require a refill of medications, please contact the office 48 hours prior to your procedure.   ? If you have an implanted Spinal Cord or Peripheral Nerve Sti Please schedule a follow up visit within 2 to 4 weeks after your last procedure date   Please call our office with any questions or concerns before or after your procedure at [phone].   If you are a diabetic, please increase the frequency of your How is radiofrequency ablation actually performed? The skin on the back or neck is cleaned with antiseptic solution and then the procedure is carried out. The skin is numbed with a local anesthetic.  Then X-ray or fluoroscopy is used to guide placement o No. This procedure is done under local anesthesia. Most of the patients also receive intravenous sedation, which makes the procedure easier to tolerate.  It is necessary for you to be awake enough to communicate easily with the physician during the procedur It is sometimes difficult to predict if the radiofrequency ablation will indeed help you or not.  Generally speaking, the patients who have responded well to trial blocks will have better results than those who responded less well from diagnostic or trial i

## 2019-06-05 ENCOUNTER — HOSPITAL ENCOUNTER (OUTPATIENT)
Facility: HOSPITAL | Age: 44
Setting detail: HOSPITAL OUTPATIENT SURGERY
Discharge: HOME | End: 2019-06-05
Attending: ANESTHESIOLOGY | Admitting: ANESTHESIOLOGY
Payer: COMMERCIAL

## 2019-06-05 ENCOUNTER — APPOINTMENT (OUTPATIENT)
Dept: GENERAL RADIOLOGY | Facility: HOSPITAL | Age: 44
End: 2019-06-05
Attending: ANESTHESIOLOGY
Payer: COMMERCIAL

## 2019-06-05 VITALS
HEART RATE: 78 BPM | OXYGEN SATURATION: 100 % | RESPIRATION RATE: 16 BRPM | SYSTOLIC BLOOD PRESSURE: 126 MMHG | TEMPERATURE: 97 F | DIASTOLIC BLOOD PRESSURE: 85 MMHG

## 2019-06-05 DIAGNOSIS — M54.12 CERVICAL RADICULITIS: ICD-10-CM

## 2019-06-05 PROCEDURE — 3E0T3BZ INTRODUCTION OF ANESTHETIC AGENT INTO PERIPHERAL NERVES AND PLEXI, PERCUTANEOUS APPROACH: ICD-10-PCS | Performed by: ANESTHESIOLOGY

## 2019-06-05 PROCEDURE — 3E0T3TZ INTRODUCTION OF DESTRUCTIVE AGENT INTO PERIPHERAL NERVES AND PLEXI, PERCUTANEOUS APPROACH: ICD-10-PCS | Performed by: ANESTHESIOLOGY

## 2019-06-05 PROCEDURE — 3E0T3GC INTRODUCTION OF OTHER THERAPEUTIC SUBSTANCE INTO PERIPHERAL NERVES AND PLEXI, PERCUTANEOUS APPROACH: ICD-10-PCS | Performed by: ANESTHESIOLOGY

## 2019-06-05 PROCEDURE — 99152 MOD SED SAME PHYS/QHP 5/>YRS: CPT | Performed by: ANESTHESIOLOGY

## 2019-06-05 PROCEDURE — BR141ZZ FLUOROSCOPY OF CERVICAL FACET JOINT(S) USING LOW OSMOLAR CONTRAST: ICD-10-PCS | Performed by: ANESTHESIOLOGY

## 2019-06-05 RX ORDER — 0.9 % SODIUM CHLORIDE 0.9 %
VIAL (ML) INJECTION AS NEEDED
Status: DISCONTINUED | OUTPATIENT
Start: 2019-06-05 | End: 2019-06-05

## 2019-06-05 RX ORDER — DIPHENHYDRAMINE HYDROCHLORIDE 50 MG/ML
50 INJECTION INTRAMUSCULAR; INTRAVENOUS ONCE AS NEEDED
Status: DISCONTINUED | OUTPATIENT
Start: 2019-06-05 | End: 2019-06-05

## 2019-06-05 RX ORDER — DEXAMETHASONE SODIUM PHOSPHATE 10 MG/ML
INJECTION, SOLUTION INTRAMUSCULAR; INTRAVENOUS AS NEEDED
Status: DISCONTINUED | OUTPATIENT
Start: 2019-06-05 | End: 2019-06-05

## 2019-06-05 RX ORDER — ONDANSETRON 2 MG/ML
4 INJECTION INTRAMUSCULAR; INTRAVENOUS ONCE AS NEEDED
Status: DISCONTINUED | OUTPATIENT
Start: 2019-06-05 | End: 2019-06-05

## 2019-06-05 RX ORDER — MIDAZOLAM HYDROCHLORIDE 1 MG/ML
INJECTION INTRAMUSCULAR; INTRAVENOUS AS NEEDED
Status: DISCONTINUED | OUTPATIENT
Start: 2019-06-05 | End: 2019-06-05

## 2019-06-05 RX ORDER — LIDOCAINE HYDROCHLORIDE 10 MG/ML
INJECTION, SOLUTION EPIDURAL; INFILTRATION; INTRACAUDAL; PERINEURAL AS NEEDED
Status: DISCONTINUED | OUTPATIENT
Start: 2019-06-05 | End: 2019-06-05

## 2019-06-05 RX ORDER — SODIUM CHLORIDE, SODIUM LACTATE, POTASSIUM CHLORIDE, CALCIUM CHLORIDE 600; 310; 30; 20 MG/100ML; MG/100ML; MG/100ML; MG/100ML
100 INJECTION, SOLUTION INTRAVENOUS CONTINUOUS
Status: DISCONTINUED | OUTPATIENT
Start: 2019-06-05 | End: 2019-06-05

## 2019-06-05 NOTE — OPERATIVE REPORT
[facility]  Operative Report  2019     Alla Gloria Patient Status:  Hospital Outpatient Surgery    1975 MRN [id number]   [facility] ENDOSCOPY Attending Susie Ruby MD   Hosp Day # 0 PCP Mathew Hilton MD     Indic positioned in an orientation tangential to the ventral aspect of the articular pillar. The needle position was confirmed in AP and lateral views.   Sensory and motor stimulation were then performed, which elicited deep left neck and shoulder discomfort but

## 2019-06-05 NOTE — H&P
History & Physical Examination    Patient Name: Dax Tolentino  MRN: [id number]  CSN: [id number]  YOB: 1975    Pre-Operative Diagnosis:  Cervical radiculitis [M54.12]    Present Illness: Patient with neck pain here for radiofrequen 2001        Years since quittin.4      Smokeless tobacco: Never Used    Alcohol use:  Yes      Alcohol/week: 2.4 oz      Types: 4 Cans of beer per week      Comment: occ/social      SYSTEM Check if Review is Normal Check if Physical Exam is Normal

## 2019-06-07 ENCOUNTER — TELEPHONE (OUTPATIENT)
Dept: SURGERY | Facility: CLINIC | Age: 44
End: 2019-06-07

## 2019-06-11 ENCOUNTER — TELEPHONE (OUTPATIENT)
Dept: PAIN CLINIC | Facility: CLINIC | Age: 44
End: 2019-06-11

## 2019-06-11 ENCOUNTER — PATIENT MESSAGE (OUTPATIENT)
Dept: PAIN CLINIC | Facility: CLINIC | Age: 44
End: 2019-06-11

## 2019-06-11 DIAGNOSIS — M54.16 LUMBAR RADICULOPATHY: Primary | ICD-10-CM

## 2019-06-11 RX ORDER — PREGABALIN 75 MG/1
75 CAPSULE ORAL 2 TIMES DAILY
Qty: 60 CAPSULE | Refills: 0 | Status: SHIPPED
Start: 2019-06-11 | End: 2019-07-11

## 2019-06-11 NOTE — TELEPHONE ENCOUNTER
From: Carlene Thomas  To: ROSE MARY Quintana  Sent: 6/11/2019 7:39 AM CDT  Subject: Visit Follow-up Question    Good morning Lisa.  I'm having a severe back pain since last night, my legs are numb and can barely walk, do I have to make an appoint

## 2019-06-11 NOTE — TELEPHONE ENCOUNTER
Medical clearance needed- no    Pt  and recommended by Lisa SAVAGE with Dr. Martha Mansfield (X 1). Please begin PA process for procedure(s).      Laterality: n/a  Level(s): n/a    Pt informed callback will be given when PA feedback received and procedure date to be

## 2019-06-11 NOTE — TELEPHONE ENCOUNTER
I have recommended a lumbar epidural steroid injection given his current symptoms. I have placed the case, can you please precertify and schedule. Thanks!

## 2019-06-11 NOTE — TELEPHONE ENCOUNTER
Started PA thru [facility] for Les 56 ([id number]) Uploaded Clinicals   Pending Reference #:[id number]

## 2019-06-12 ENCOUNTER — HOSPITAL ENCOUNTER (OUTPATIENT)
Facility: HOSPITAL | Age: 44
Setting detail: HOSPITAL OUTPATIENT SURGERY
Discharge: HOME | End: 2019-06-12
Attending: ANESTHESIOLOGY | Admitting: ANESTHESIOLOGY
Payer: COMMERCIAL

## 2019-06-12 ENCOUNTER — APPOINTMENT (OUTPATIENT)
Dept: GENERAL RADIOLOGY | Facility: HOSPITAL | Age: 44
End: 2019-06-12
Attending: ANESTHESIOLOGY
Payer: COMMERCIAL

## 2019-06-12 VITALS
RESPIRATION RATE: 18 BRPM | HEART RATE: 74 BPM | SYSTOLIC BLOOD PRESSURE: 101 MMHG | OXYGEN SATURATION: 95 % | DIASTOLIC BLOOD PRESSURE: 76 MMHG | TEMPERATURE: 98 F

## 2019-06-12 PROCEDURE — 3E0T3TZ INTRODUCTION OF DESTRUCTIVE AGENT INTO PERIPHERAL NERVES AND PLEXI, PERCUTANEOUS APPROACH: ICD-10-PCS | Performed by: ANESTHESIOLOGY

## 2019-06-12 PROCEDURE — BR141ZZ FLUOROSCOPY OF CERVICAL FACET JOINT(S) USING LOW OSMOLAR CONTRAST: ICD-10-PCS | Performed by: ANESTHESIOLOGY

## 2019-06-12 PROCEDURE — 99152 MOD SED SAME PHYS/QHP 5/>YRS: CPT | Performed by: ANESTHESIOLOGY

## 2019-06-12 RX ORDER — LIDOCAINE HYDROCHLORIDE 10 MG/ML
INJECTION, SOLUTION EPIDURAL; INFILTRATION; INTRACAUDAL; PERINEURAL AS NEEDED
Status: DISCONTINUED | OUTPATIENT
Start: 2019-06-12 | End: 2019-06-12

## 2019-06-12 RX ORDER — MIDAZOLAM HYDROCHLORIDE 1 MG/ML
INJECTION INTRAMUSCULAR; INTRAVENOUS AS NEEDED
Status: DISCONTINUED | OUTPATIENT
Start: 2019-06-12 | End: 2019-06-12

## 2019-06-12 RX ORDER — ONDANSETRON 2 MG/ML
4 INJECTION INTRAMUSCULAR; INTRAVENOUS ONCE AS NEEDED
Status: DISCONTINUED | OUTPATIENT
Start: 2019-06-12 | End: 2019-06-12

## 2019-06-12 RX ORDER — DEXAMETHASONE SODIUM PHOSPHATE 10 MG/ML
INJECTION, SOLUTION INTRAMUSCULAR; INTRAVENOUS AS NEEDED
Status: DISCONTINUED | OUTPATIENT
Start: 2019-06-12 | End: 2019-06-12

## 2019-06-12 RX ORDER — 0.9 % SODIUM CHLORIDE 0.9 %
VIAL (ML) INJECTION AS NEEDED
Status: DISCONTINUED | OUTPATIENT
Start: 2019-06-12 | End: 2019-06-12

## 2019-06-12 RX ORDER — DIPHENHYDRAMINE HYDROCHLORIDE 50 MG/ML
50 INJECTION INTRAMUSCULAR; INTRAVENOUS ONCE AS NEEDED
Status: DISCONTINUED | OUTPATIENT
Start: 2019-06-12 | End: 2019-06-12

## 2019-06-12 RX ORDER — SODIUM CHLORIDE, SODIUM LACTATE, POTASSIUM CHLORIDE, CALCIUM CHLORIDE 600; 310; 30; 20 MG/100ML; MG/100ML; MG/100ML; MG/100ML
100 INJECTION, SOLUTION INTRAVENOUS CONTINUOUS
Status: DISCONTINUED | OUTPATIENT
Start: 2019-06-12 | End: 2019-06-12

## 2019-06-12 NOTE — TELEPHONE ENCOUNTER
Called patient and scheduled procedure. Instructions below reviewed including fasting, NSAID hold and need for . Patient verbalized understanding.     [address]  PRE-PROCEDURE INSTRUCTIONS WITH IV SEDATION        Appointment Date: 6/ ? Please note-No prescriptions will be written by Pain Clinic in [place] on the day of procedure. If you require a refill of medications, please contact the office 48 hours prior to your procedure.   ? If you have an implanted Spinal Cord or Peripheral Nerve Sti In the event you need to cancel or reschedule your appointment, you must notify the office 24 hours prior.     Post-procedure instructions:        Please schedule a follow up visit within 2 to 4 weeks after your last procedure date   Please call our office The long acting anti-inflammatory medication, or steroid, that is injected reduces inflammation and swelling of the nerves and other tissues surrounding the nerve roots.  This may reduce pain, tingling and numbness and other symptoms caused by such inflamma You should be able to go back to work the next day. Again it is normal to feel some soreness or aching at the injection site.    How long does the effect of the medication last?  The steroid starts working in about 3 to 5 days and its effect can last for s You should contact our office immediately if you experience any side effects. If they occur, these effects usually resolve within a few days.        Sharena Palmetto 2016

## 2019-06-12 NOTE — H&P
History & Physical Examination    Patient Name: Bubba Eubanks  MRN: [id number]  CSN: [id number]  YOB: 1975    Pre-Operative Diagnosis:  Headache disorder [R51]  Arthropathy of cervical facet joint [M47.812]    Present Illness: Hans Freeman Sister    • Other (Healthy) Brother    • Other (Healthy) Brother      Social History    Tobacco Use      Smoking status: Former Smoker        Types: Cigarettes        Quit date: 2001        Years since quittin.4      Smokeless tobacco: Never Used

## 2019-06-12 NOTE — TELEPHONE ENCOUNTER
Prior authorization request completed for: Mittie Manual #[id number]  Authorization dates: 07/01/2019  CPT codes approved: 26343  Number of visits/dates of service approved: 1  Physician: Dr. Ata Mendez    *authorization is date specific.  Please not

## 2019-06-12 NOTE — OPERATIVE REPORT
[facility]  Operative Report  2019     Filomena Gloria Patient Status:  Hospital Outpatient Surgery    1975 MRN [id number]   [facility] ENDOSCOPY Attending Chito Pacheco MD   Hosp Day # 0 PCP Felicia Osman MD     Belem C3-C4 level. The needle was positioned in an orientation tangential to the ventral aspect of the articular pillar. The needle position was confirmed in AP and lateral views.   Sensory and motor stimulation were then performed, which elicited deep left [place]

## 2019-06-13 ENCOUNTER — TELEPHONE (OUTPATIENT)
Dept: SURGERY | Facility: CLINIC | Age: 44
End: 2019-06-13

## 2019-06-13 NOTE — TELEPHONE ENCOUNTER
Left message for patient to call back to confirm procedure date of 6/19/19 with Dr Lily German and to be checked in at outpatient registration at 9:15 am. Patient to be instructed to call pre-procedure line before procedure at [phone].  Patient to be instruct

## 2019-06-16 DIAGNOSIS — M17.0 PRIMARY OSTEOARTHRITIS OF BOTH KNEES: ICD-10-CM

## 2019-06-16 DIAGNOSIS — M54.12 CERVICAL RADICULOPATHY: ICD-10-CM

## 2019-06-17 RX ORDER — BACLOFEN 10 MG/1
TABLET ORAL
Qty: 60 TABLET | Refills: 0 | Status: SHIPPED | OUTPATIENT
Start: 2019-06-17 | End: 2019-07-16

## 2019-06-19 ENCOUNTER — HOSPITAL ENCOUNTER (OUTPATIENT)
Facility: HOSPITAL | Age: 44
Setting detail: HOSPITAL OUTPATIENT SURGERY
Discharge: HOME | End: 2019-06-19
Attending: ANESTHESIOLOGY | Admitting: ANESTHESIOLOGY
Payer: COMMERCIAL

## 2019-06-19 ENCOUNTER — APPOINTMENT (OUTPATIENT)
Dept: GENERAL RADIOLOGY | Facility: HOSPITAL | Age: 44
End: 2019-06-19
Attending: ANESTHESIOLOGY
Payer: COMMERCIAL

## 2019-06-19 VITALS
DIASTOLIC BLOOD PRESSURE: 76 MMHG | RESPIRATION RATE: 18 BRPM | HEART RATE: 83 BPM | TEMPERATURE: 97 F | OXYGEN SATURATION: 100 % | SYSTOLIC BLOOD PRESSURE: 114 MMHG

## 2019-06-19 DIAGNOSIS — M54.16 LUMBAR RADICULOPATHY: ICD-10-CM

## 2019-06-19 PROCEDURE — 99152 MOD SED SAME PHYS/QHP 5/>YRS: CPT | Performed by: ANESTHESIOLOGY

## 2019-06-19 PROCEDURE — B01BYZZ FLUOROSCOPY OF SPINAL CORD USING OTHER CONTRAST: ICD-10-PCS | Performed by: ANESTHESIOLOGY

## 2019-06-19 PROCEDURE — 3E0R33Z INTRODUCTION OF ANTI-INFLAMMATORY INTO SPINAL CANAL, PERCUTANEOUS APPROACH: ICD-10-PCS | Performed by: ANESTHESIOLOGY

## 2019-06-19 RX ORDER — DEXAMETHASONE SODIUM PHOSPHATE 10 MG/ML
INJECTION, SOLUTION INTRAMUSCULAR; INTRAVENOUS AS NEEDED
Status: DISCONTINUED | OUTPATIENT
Start: 2019-06-19 | End: 2019-06-19

## 2019-06-19 RX ORDER — 0.9 % SODIUM CHLORIDE 0.9 %
VIAL (ML) INJECTION AS NEEDED
Status: DISCONTINUED | OUTPATIENT
Start: 2019-06-19 | End: 2019-06-19

## 2019-06-19 RX ORDER — MIDAZOLAM HYDROCHLORIDE 1 MG/ML
INJECTION INTRAMUSCULAR; INTRAVENOUS AS NEEDED
Status: DISCONTINUED | OUTPATIENT
Start: 2019-06-19 | End: 2019-06-19

## 2019-06-19 RX ORDER — LIDOCAINE HYDROCHLORIDE 10 MG/ML
INJECTION, SOLUTION EPIDURAL; INFILTRATION; INTRACAUDAL; PERINEURAL AS NEEDED
Status: DISCONTINUED | OUTPATIENT
Start: 2019-06-19 | End: 2019-06-19

## 2019-06-19 RX ORDER — SODIUM CHLORIDE, SODIUM LACTATE, POTASSIUM CHLORIDE, CALCIUM CHLORIDE 600; 310; 30; 20 MG/100ML; MG/100ML; MG/100ML; MG/100ML
100 INJECTION, SOLUTION INTRAVENOUS CONTINUOUS
Status: DISCONTINUED | OUTPATIENT
Start: 2019-06-19 | End: 2019-06-19

## 2019-06-19 RX ORDER — DIPHENHYDRAMINE HYDROCHLORIDE 50 MG/ML
50 INJECTION INTRAMUSCULAR; INTRAVENOUS ONCE AS NEEDED
Status: DISCONTINUED | OUTPATIENT
Start: 2019-06-19 | End: 2019-06-19

## 2019-06-19 RX ORDER — ONDANSETRON 2 MG/ML
4 INJECTION INTRAMUSCULAR; INTRAVENOUS ONCE AS NEEDED
Status: DISCONTINUED | OUTPATIENT
Start: 2019-06-19 | End: 2019-06-19

## 2019-06-19 NOTE — OPERATIVE REPORT
[facility]  Operative Report  2019     Chris Gloria Patient Status:  Hospital Outpatient Surgery    1975 MRN [id number]   [facility] ENDOSCOPY Attending Mehdi Thornton MD   Hosp Day # 0 PCP Jackelyn Andrew MD     Belem the Tuohy needle. The needle was flushed with 1 mL lidocaine. The needle was withdrawn with the stylet intact in situ. The needle's tip was intact. The patient tolerated the procedure very well without significant immediate complication.   The patient's

## 2019-06-19 NOTE — H&P
History & Physical Examination    Patient Name: Fiorella Mccormick  MRN: [id number]  CSN: [id number]  YOB: 1975    Pre-Operative Diagnosis:  Lumbar radiculopathy [M54.16]    Present Illness: Patient with low back pain here for epidural Grandmother    • Other (Healthy) Sister    • Other (Healthy) Brother    • Other (Healthy) Brother      Social History    Tobacco Use      Smoking status: Former Smoker        Types: Cigarettes        Quit date: 2001        Years since quittin.4

## 2019-06-25 ENCOUNTER — OFFICE VISIT (OUTPATIENT)
Dept: PAIN CLINIC | Facility: CLINIC | Age: 44
End: 2019-06-25
Payer: COMMERCIAL

## 2019-06-25 VITALS — HEART RATE: 95 BPM | SYSTOLIC BLOOD PRESSURE: 112 MMHG | DIASTOLIC BLOOD PRESSURE: 80 MMHG | OXYGEN SATURATION: 97 %

## 2019-06-25 DIAGNOSIS — R56.9 SEIZURES (HCC): Primary | ICD-10-CM

## 2019-06-25 DIAGNOSIS — M47.812 ARTHROPATHY OF CERVICAL FACET JOINT: ICD-10-CM

## 2019-06-25 PROCEDURE — 99213 OFFICE O/P EST LOW 20 MIN: CPT | Performed by: ANESTHESIOLOGY

## 2019-06-25 NOTE — PROGRESS NOTES
Name: Catalino Miller   : 1975   DOS: 2019     Pain Clinic Follow Up Visit:   Patient presents with:   Follow - Up      Catalino Miller is a 44year old male with multiple pain complaints who presents today accompanied by normal.  Neck: Range of motion is preserved  Cranial nerves grossly intact    IMAGES:     MRI of brain from February grossly normal    ASSESSMENT AND PLAN:   Seizures ([facility] [address]  (primary encounter diagnosis)  Arthropathy of cervical facet joint    The patient

## 2019-07-01 ENCOUNTER — OFFICE VISIT (OUTPATIENT)
Dept: NEUROLOGY | Facility: CLINIC | Age: 44
End: 2019-07-01
Payer: COMMERCIAL

## 2019-07-01 VITALS
BODY MASS INDEX: 26.51 KG/M2 | HEIGHT: 69 IN | SYSTOLIC BLOOD PRESSURE: 120 MMHG | WEIGHT: 179 LBS | DIASTOLIC BLOOD PRESSURE: 81 MMHG | RESPIRATION RATE: 16 BRPM | HEART RATE: 101 BPM

## 2019-07-01 DIAGNOSIS — R29.818 TRANSIENT NEUROLOGICAL SYMPTOMS: Primary | ICD-10-CM

## 2019-07-01 DIAGNOSIS — R55 SYNCOPE, UNSPECIFIED SYNCOPE TYPE: ICD-10-CM

## 2019-07-01 PROCEDURE — 99203 OFFICE O/P NEW LOW 30 MIN: CPT | Performed by: PHYSICIAN ASSISTANT

## 2019-07-01 NOTE — PROGRESS NOTES
[facility] with [address]  9/20/1975  Primary Care Provider:  Ganesh Castro MD    7/1/2019  Accompanied visit: Yes-Friend from work  Patient was referred by Dr. Franca Montano fluid collection. CEREBRUM:     No focal signal abnormality of the gray or white matter is perceived. No intraparenchymal hemorrhage, edema, or cortical sulcal effacement is apparent.  There is no space-occupying lesion, mass effect, or shift of midline s INJECTION Left 3/25/2019    Performed by Lona Kessler MD at [facility] ENDOSCOPY   • CHOLECYSTECTOMY  2012    Open procedure   • KNEE JOINT INJECTION UNDER FLUOROSCOPY RIGHT OR LEFT Left 4/8/2019    Performed by Lona Kessler MD at [address] [place] [address] Orders:    Patient is a 44year old male with episodes of transient neurologic symptoms will need MRA neck to evaluate the posterior circulation and EEG to check for seizure activity. Recommended no driving until completion of work-up.  They expressed full

## 2019-07-12 ENCOUNTER — OFFICE VISIT (OUTPATIENT)
Dept: INTERNAL MEDICINE CLINIC | Facility: CLINIC | Age: 44
End: 2019-07-12
Payer: COMMERCIAL

## 2019-07-12 VITALS
WEIGHT: 182 LBS | BODY MASS INDEX: 26.96 KG/M2 | RESPIRATION RATE: 16 BRPM | DIASTOLIC BLOOD PRESSURE: 70 MMHG | TEMPERATURE: 98 F | SYSTOLIC BLOOD PRESSURE: 110 MMHG | HEIGHT: 69 IN | HEART RATE: 96 BPM

## 2019-07-12 DIAGNOSIS — G89.29 CHRONIC PAIN OF RIGHT KNEE: ICD-10-CM

## 2019-07-12 DIAGNOSIS — M25.561 CHRONIC PAIN OF RIGHT KNEE: ICD-10-CM

## 2019-07-12 DIAGNOSIS — R29.818 TRANSIENT NEUROLOGICAL SYMPTOMS: ICD-10-CM

## 2019-07-12 DIAGNOSIS — M54.12 CERVICAL RADICULOPATHY: Primary | ICD-10-CM

## 2019-07-12 DIAGNOSIS — M25.562 CHRONIC PAIN OF LEFT KNEE: ICD-10-CM

## 2019-07-12 DIAGNOSIS — G89.29 CHRONIC PAIN OF LEFT KNEE: ICD-10-CM

## 2019-07-12 DIAGNOSIS — G89.29 CHRONIC LEFT SHOULDER PAIN: ICD-10-CM

## 2019-07-12 DIAGNOSIS — M25.512 CHRONIC LEFT SHOULDER PAIN: ICD-10-CM

## 2019-07-12 PROCEDURE — 99214 OFFICE O/P EST MOD 30 MIN: CPT | Performed by: INTERNAL MEDICINE

## 2019-07-12 RX ORDER — HYDROCODONE BITARTRATE AND ACETAMINOPHEN 5; 325 MG/1; MG/1
1 TABLET ORAL EVERY 8 HOURS PRN
Qty: 40 TABLET | Refills: 0 | Status: SHIPPED | OUTPATIENT
Start: 2019-07-12 | End: 2019-08-01

## 2019-07-12 RX ORDER — PREGABALIN 75 MG/1
75 CAPSULE ORAL 2 TIMES DAILY
COMMUNITY
End: 2019-08-02

## 2019-07-12 NOTE — PATIENT INSTRUCTIONS
- Norco prescribed for pain. Use only as needed. This medication can cause drowsiness. We will fill short term prescription for now. - Follow up with Neurology as scheduled. - Schedule appointments with [facility].     It was
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
## 2019-07-16 DIAGNOSIS — M54.12 CERVICAL RADICULOPATHY: ICD-10-CM

## 2019-07-16 DIAGNOSIS — M17.0 PRIMARY OSTEOARTHRITIS OF BOTH KNEES: ICD-10-CM

## 2019-07-16 RX ORDER — BACLOFEN 10 MG/1
TABLET ORAL
Qty: 60 TABLET | Refills: 0 | Status: SHIPPED | OUTPATIENT
Start: 2019-07-16 | End: 2019-08-19 | Stop reason: ALTCHOICE

## 2019-07-16 RX ORDER — BACLOFEN 10 MG/1
TABLET ORAL
Qty: 60 TABLET | Refills: 0 | Status: SHIPPED | OUTPATIENT
Start: 2019-07-16 | End: 2020-07-20

## 2019-07-16 NOTE — TELEPHONE ENCOUNTER
Medication: DICLOFENAC SODIUM 50 MG Oral Tab EC    Date of last refill: 6/17/19  [place]    Last office visit: 6/25/19  Due back to clinic per last office note:  n/a  Date next office visit scheduled:  none    Last U

## 2019-07-16 NOTE — TELEPHONE ENCOUNTER
Medication: BACLOFEN 10 MG Oral Tab    Date of last refill: 6/17/19  [place]    Last office visit: 6/25/19  Due back to clinic per last office note:  Not indicated  Date next office visit scheduled:  none    Last URI

## 2019-07-18 ENCOUNTER — OFFICE VISIT (OUTPATIENT)
Dept: INTERNAL MEDICINE CLINIC | Facility: CLINIC | Age: 44
End: 2019-07-18
Payer: COMMERCIAL

## 2019-07-18 VITALS
DIASTOLIC BLOOD PRESSURE: 82 MMHG | TEMPERATURE: 98 F | SYSTOLIC BLOOD PRESSURE: 104 MMHG | BODY MASS INDEX: 27.03 KG/M2 | OXYGEN SATURATION: 99 % | HEIGHT: 69 IN | WEIGHT: 182.5 LBS | RESPIRATION RATE: 16 BRPM | HEART RATE: 83 BPM

## 2019-07-18 DIAGNOSIS — K59.00 CONSTIPATION, UNSPECIFIED CONSTIPATION TYPE: ICD-10-CM

## 2019-07-18 DIAGNOSIS — Z00.00 ENCOUNTER FOR PREVENTATIVE ADULT HEALTH CARE EXAMINATION: Primary | ICD-10-CM

## 2019-07-18 DIAGNOSIS — Z20.2 POSSIBLE EXPOSURE TO STD: ICD-10-CM

## 2019-07-18 DIAGNOSIS — R21 RASH AND NONSPECIFIC SKIN ERUPTION: ICD-10-CM

## 2019-07-18 DIAGNOSIS — G89.29 CHRONIC JOINT PAIN: ICD-10-CM

## 2019-07-18 DIAGNOSIS — E55.9 VITAMIN D DEFICIENCY: ICD-10-CM

## 2019-07-18 DIAGNOSIS — M25.50 CHRONIC JOINT PAIN: ICD-10-CM

## 2019-07-18 PROCEDURE — 99396 PREV VISIT EST AGE 40-64: CPT | Performed by: INTERNAL MEDICINE

## 2019-07-18 RX ORDER — LACTULOSE 20 G/30ML
20 SOLUTION ORAL EVERY 8 HOURS PRN
Qty: 473 ML | Refills: 0 | Status: SHIPPED | OUTPATIENT
Start: 2019-07-18 | End: 2020-01-16

## 2019-07-18 NOTE — PATIENT INSTRUCTIONS
- Get blood work done when fasting. Needs to be done at [facility] labs per new insurance requirements. - Use lactulose as needed for constipation (every 8 hours as needed)  - Use triamcinolone cream twice daily for rash on arms.   - [facility] website: https://
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
## 2019-07-18 NOTE — PROGRESS NOTES
Kevin Garcia is a 44year old male. HPI:   Patient presents with:  CPX: irregualar bowel movements, rash on skin (07/14/2019)    Patient presents for CPX/wellness examination. Diet: A lot fruits and vegetables, freshly cooked foods.     Exe 60 tablet, Rfl: 0  •  HYDROcodone-acetaminophen 5-325 MG Oral Tab, Take 1 tablet by mouth every 8 (eight) hours as needed for Pain., Disp: 40 tablet, Rfl: 0  •  pregabalin 75 MG Oral Cap, Take 75 mg by mouth 2 (two) times daily. , Disp: , Rfl:   Medical:  h AND PLAN:   1. Encounter for preventative adult health care examination  Age appropriate health guidance and counseling provided. Screening labs ordered as below. Body mass index is 26.95 kg/m². - CBC WITH DIFFERENTIAL WITH PLATELET;  Future  - COMP ME return to clinic in 3-6 months with Dr. Beti Alegre MD for follow up on chronic issues, or earlier if acute issues arise.     Joaquín Johnson MD

## 2019-07-24 LAB
ABSOLUTE BASOPHILS: 32 CELLS/UL (ref 0–200)
ABSOLUTE EOSINOPHILS: 111 CELLS/UL (ref 15–500)
ABSOLUTE LYMPHOCYTES: 1670 CELLS/UL (ref 850–3900)
ABSOLUTE MONOCYTES: 366 CELLS/UL (ref 200–950)
ABSOLUTE NEUTROPHILS: 3122 CELLS/UL (ref 1500–7800)
ALBUMIN/GLOBULIN RATIO: 1.9 (CALC) (ref 1–2.5)
ALBUMIN: 4.8 G/DL (ref 3.6–5.1)
ALKALINE PHOSPHATASE: 62 U/L (ref 40–115)
ALT: 30 U/L (ref 9–46)
AST: 19 U/L (ref 10–40)
BASOPHILS: 0.6 %
BILIRUBIN, TOTAL: 0.4 MG/DL (ref 0.2–1.2)
BUN: 12 MG/DL (ref 7–25)
CALCIUM: 9.6 MG/DL (ref 8.6–10.3)
CARBON DIOXIDE: 23 MMOL/L (ref 20–32)
CHLAMYDIA TRACHOMATIS$RNA, TMA: NOT DETECTED
CHLORIDE: 107 MMOL/L (ref 98–110)
CHOL/HDLC RATIO: 6.4 (CALC)
CHOLESTEROL, TOTAL: 264 MG/DL
CREATININE: 0.85 MG/DL (ref 0.6–1.35)
EGFR IF AFRICN AM: 125 ML/MIN/1.73M2
EGFR IF NONAFRICN AM: 107 ML/MIN/1.73M2
EOSINOPHILS: 2.1 %
GLOBULIN: 2.5 G/DL (CALC) (ref 1.9–3.7)
GLUCOSE: 99 MG/DL (ref 65–99)
HDL CHOLESTEROL: 41 MG/DL
HEMATOCRIT: 45.1 % (ref 38.5–50)
HEMOGLOBIN A1C: 5.1 % OF TOTAL HGB
HEMOGLOBIN: 15 G/DL (ref 13.2–17.1)
LYMPHOCYTES: 31.5 %
MCH: 28.7 PG (ref 27–33)
MCHC: 33.3 G/DL (ref 32–36)
MCV: 86.4 FL (ref 80–100)
MONOCYTES: 6.9 %
MPV: 9.2 FL (ref 7.5–12.5)
NEISSERIA GONORRHOEAE$RNA, TMA: NOT DETECTED
NEUTROPHILS: 58.9 %
NON-HDL CHOLESTEROL: 223 MG/DL (CALC)
PLATELET COUNT: 229 THOUSAND/UL (ref 140–400)
POTASSIUM: 4.2 MMOL/L (ref 3.5–5.3)
PROTEIN, TOTAL: 7.3 G/DL (ref 6.1–8.1)
PSA, TOTAL: 0.7 NG/ML
RDW: 12.8 % (ref 11–15)
RED BLOOD CELL COUNT: 5.22 MILLION/UL (ref 4.2–5.8)
SODIUM: 138 MMOL/L (ref 135–146)
TRIGLYCERIDES: 450 MG/DL
TSH W/REFLEX TO FT4: 2.27 MIU/L (ref 0.4–4.5)
VITAMIN D, 25-OH, TOTAL: 11 NG/ML (ref 30–100)
WHITE BLOOD CELL COUNT: 5.3 THOUSAND/UL (ref 3.8–10.8)

## 2019-07-29 ENCOUNTER — TELEPHONE (OUTPATIENT)
Dept: SURGERY | Facility: CLINIC | Age: 44
End: 2019-07-29

## 2019-07-29 NOTE — TELEPHONE ENCOUNTER
MRA of neck was denied. Per denial reason below; Initial Imaging, we are unable to approve the requested procedure. Guidelines support carotid ultrasound prior to considering other imaging.  The clinical information provided does not describe abnormal u

## 2019-07-29 NOTE — TELEPHONE ENCOUNTER
MRA neck [id number] denied    Denial reason:    Initial Imaging, we are unable to approve the requested procedure. Guidelines support carotid ultrasound prior to considering other imaging.  The clinical information provided does not describe abnormal ultrasoun appeal, the member, the [facility] primary care physician, and any health care provider who recommended the health care service involved in the appeal orally of the decision followed-up by a written notice of the determination.

## 2019-07-30 NOTE — TELEPHONE ENCOUNTER
PA Dept does not handle Appeals.      Case #[id number]     • A written appeal request asking us to reconsider our decision   • The specific coverage decision you would like us to review   • An explanation of why the requested service should be considered fo

## 2019-07-31 NOTE — TELEPHONE ENCOUNTER
Clarified with referrals team that to best of their knowledge, no ANGELA or authorized representative designation is required for MD to appeal.    Appeal letter faxed to [facility] marked urgent, appeal pending.

## 2019-08-01 DIAGNOSIS — G89.29 CHRONIC PAIN OF LEFT KNEE: ICD-10-CM

## 2019-08-01 DIAGNOSIS — G89.29 CHRONIC PAIN OF RIGHT KNEE: ICD-10-CM

## 2019-08-01 DIAGNOSIS — M54.12 CERVICAL RADICULOPATHY: ICD-10-CM

## 2019-08-01 DIAGNOSIS — M25.512 CHRONIC LEFT SHOULDER PAIN: ICD-10-CM

## 2019-08-01 DIAGNOSIS — G89.29 CHRONIC LEFT SHOULDER PAIN: ICD-10-CM

## 2019-08-01 DIAGNOSIS — M25.561 CHRONIC PAIN OF RIGHT KNEE: ICD-10-CM

## 2019-08-01 DIAGNOSIS — M25.562 CHRONIC PAIN OF LEFT KNEE: ICD-10-CM

## 2019-08-01 RX ORDER — HYDROCODONE BITARTRATE AND ACETAMINOPHEN 5; 325 MG/1; MG/1
1 TABLET ORAL EVERY 8 HOURS PRN
Qty: 40 TABLET | Refills: 0 | Status: SHIPPED | OUTPATIENT
Start: 2019-08-01 | End: 2019-08-19

## 2019-08-01 NOTE — TELEPHONE ENCOUNTER
No protocol     Last refill:  7/12/2019 #40 NR    LOV:   7/18/2019 Dr Gilma Muse RTC ?     No FOV scheduled

## 2019-08-02 ENCOUNTER — PATIENT MESSAGE (OUTPATIENT)
Dept: INTERNAL MEDICINE CLINIC | Facility: CLINIC | Age: 44
End: 2019-08-02

## 2019-08-12 NOTE — TELEPHONE ENCOUNTER
Received letter from [facility], dated 8/1/19, stating this test does not meet urgent appeal criteria. They are processing it as a standard appeal.  Letter placed in RN bin.

## 2019-08-14 ENCOUNTER — TELEPHONE (OUTPATIENT)
Dept: INTERNAL MEDICINE CLINIC | Facility: CLINIC | Age: 44
End: 2019-08-14

## 2019-08-14 DIAGNOSIS — M17.0 PRIMARY OSTEOARTHRITIS OF BOTH KNEES: ICD-10-CM

## 2019-08-14 DIAGNOSIS — M54.12 CERVICAL RADICULOPATHY: ICD-10-CM

## 2019-08-14 NOTE — TELEPHONE ENCOUNTER
Patient scheduled an appointment through my chart with Dr. Mel Steve on 09/19 for blacking out and neck pain. Please call patient to triage.

## 2019-08-14 NOTE — TELEPHONE ENCOUNTER
Diclofenac has been discontinued and additional NSAIDs d/t being being on TRUVADA medication and interactions.

## 2019-08-14 NOTE — TELEPHONE ENCOUNTER
Medication: DICLOFENAC SODIUM 50 MG Oral Tab EC    Date of last refill: 6/17/19  [place]    Last office visit: 6/25/19  Due back to clinic pper last office note:  Not indicated  Date next office visit scheduled:  Non

## 2019-08-16 DIAGNOSIS — M25.561 CHRONIC PAIN OF RIGHT KNEE: ICD-10-CM

## 2019-08-16 DIAGNOSIS — M25.562 CHRONIC PAIN OF LEFT KNEE: ICD-10-CM

## 2019-08-16 DIAGNOSIS — M25.512 CHRONIC LEFT SHOULDER PAIN: ICD-10-CM

## 2019-08-16 DIAGNOSIS — G89.29 CHRONIC PAIN OF LEFT KNEE: ICD-10-CM

## 2019-08-16 DIAGNOSIS — G89.29 CHRONIC LEFT SHOULDER PAIN: ICD-10-CM

## 2019-08-16 DIAGNOSIS — G89.29 CHRONIC PAIN OF RIGHT KNEE: ICD-10-CM

## 2019-08-16 DIAGNOSIS — M54.12 CERVICAL RADICULOPATHY: ICD-10-CM

## 2019-08-16 RX ORDER — HYDROCODONE BITARTRATE AND ACETAMINOPHEN 5; 325 MG/1; MG/1
1 TABLET ORAL EVERY 8 HOURS PRN
Qty: 40 TABLET | Refills: 0 | Status: CANCELLED | OUTPATIENT
Start: 2019-08-16

## 2019-08-16 NOTE — TELEPHONE ENCOUNTER
Pt stated seeing neuro from [facility], has completed testing and has an EEG scheduled for Sept 6th. Pt stated syncope episodes have happed about 4 times the last months  Head hurst, back and neck. tingling on head. Currently taking Lyrica for back pain. Pt stated only needs to update Dr Lindi Cogan and also inquire for his opinion on the testing.  Pt has scheduled an ov for 8/29/19

## 2019-08-19 ENCOUNTER — OFFICE VISIT (OUTPATIENT)
Dept: INTERNAL MEDICINE CLINIC | Facility: CLINIC | Age: 44
End: 2019-08-19
Payer: COMMERCIAL

## 2019-08-19 VITALS
HEART RATE: 80 BPM | TEMPERATURE: 98 F | DIASTOLIC BLOOD PRESSURE: 70 MMHG | BODY MASS INDEX: 26.73 KG/M2 | WEIGHT: 180.5 LBS | HEIGHT: 69 IN | RESPIRATION RATE: 16 BRPM | SYSTOLIC BLOOD PRESSURE: 112 MMHG

## 2019-08-19 DIAGNOSIS — G89.29 CHRONIC PAIN OF RIGHT KNEE: ICD-10-CM

## 2019-08-19 DIAGNOSIS — M54.12 CERVICAL RADICULOPATHY: ICD-10-CM

## 2019-08-19 DIAGNOSIS — M25.512 CHRONIC LEFT SHOULDER PAIN: ICD-10-CM

## 2019-08-19 DIAGNOSIS — G89.29 CHRONIC LEFT SHOULDER PAIN: ICD-10-CM

## 2019-08-19 DIAGNOSIS — R29.818 TRANSIENT NEUROLOGICAL SYMPTOMS: Primary | ICD-10-CM

## 2019-08-19 DIAGNOSIS — M25.561 CHRONIC PAIN OF RIGHT KNEE: ICD-10-CM

## 2019-08-19 DIAGNOSIS — G89.29 CHRONIC PAIN OF LEFT KNEE: ICD-10-CM

## 2019-08-19 DIAGNOSIS — M25.562 CHRONIC PAIN OF LEFT KNEE: ICD-10-CM

## 2019-08-19 PROCEDURE — 99214 OFFICE O/P EST MOD 30 MIN: CPT | Performed by: INTERNAL MEDICINE

## 2019-08-19 RX ORDER — PREGABALIN 75 MG/1
75 CAPSULE ORAL 2 TIMES DAILY
Qty: 60 CAPSULE | Refills: 1 | OUTPATIENT
Start: 2019-08-19 | End: 2019-09-18

## 2019-08-19 RX ORDER — PREGABALIN 75 MG/1
75 CAPSULE ORAL 2 TIMES DAILY
Qty: 60 CAPSULE | Refills: 2 | Status: SHIPPED | OUTPATIENT
Start: 2019-08-19 | End: 2019-09-20

## 2019-08-19 RX ORDER — HYDROCODONE BITARTRATE AND ACETAMINOPHEN 5; 325 MG/1; MG/1
1 TABLET ORAL EVERY 8 HOURS PRN
Qty: 40 TABLET | Refills: 0 | Status: SHIPPED | OUTPATIENT
Start: 2019-08-19 | End: 2019-09-06

## 2019-08-19 NOTE — PROGRESS NOTES
Guillaume Zambrano is a 44year old male. HPI:   Patient presents with:  Back Pain  Fainting: On Thursday pt states he had a fainting episode  Patient presents to discuss several issues. Had another fainting/pre-syncopal episode last night.   H acetonide 0.1 % External Cream, Apply topically 2 (two) times daily as needed. , Disp: 80 g, Rfl: 0  •  BACLOFEN 10 MG Oral Tab, TAKE 1 TABLET(10 MG) BY MOUTH EVERY 8 HOURS AS NEEDED FOR MUSCLE SPASM, Disp: 60 tablet, Rfl: 0  Medical:  has a past medical hi symptoms  Had another episode last Thursday. Seems to have been preceded by shoulder and neck pain. During episode, he was less alert, also felt very weak. His  had to carry him off the sofa. He did do some yard work earlier that day.   Unclear e Assistant)  The patient indicates understanding of these issues and agrees to the plan. The patient is asked to return to clinic in 3 months for follow up on chronic issues, or earlier if acute issues arise.     Taisha Clayton MD

## 2019-08-19 NOTE — PATIENT INSTRUCTIONS
- Get CD scans from medical records  - We will continue Lyrica and Norco for now. Goal is to taper you off the Norco as soon as possible. - Follow up with Pain Management as scheduled.   You can ask them if they recommend physical therapy or surgeon evalu

## 2019-08-22 ENCOUNTER — TELEPHONE (OUTPATIENT)
Dept: INTERNAL MEDICINE CLINIC | Facility: CLINIC | Age: 44
End: 2019-08-22

## 2019-08-22 DIAGNOSIS — M25.569 KNEE PAIN, UNSPECIFIED CHRONICITY, UNSPECIFIED LATERALITY: ICD-10-CM

## 2019-08-22 DIAGNOSIS — M54.50 BILATERAL LOW BACK PAIN WITHOUT SCIATICA, UNSPECIFIED CHRONICITY: ICD-10-CM

## 2019-08-22 DIAGNOSIS — M54.2 NECK PAIN: ICD-10-CM

## 2019-08-22 DIAGNOSIS — M25.512 LEFT SHOULDER PAIN, UNSPECIFIED CHRONICITY: Primary | ICD-10-CM

## 2019-08-22 NOTE — TELEPHONE ENCOUNTER
referral   Received:  Today   Message Contents   [facility] Clinical Staff Cc: [facility] Referral Pool   Phone Number: [phone]             .Reason for the order/referral: referral   PCP:  Dr Bear Alexandra   Refer to Provider Dr Russell Horn

## 2019-08-26 NOTE — TELEPHONE ENCOUNTER
Received letter from [facility] overturning original denial of MRA.  Faxed letter to prior auth team.

## 2019-09-03 ENCOUNTER — HOSPITAL ENCOUNTER (OUTPATIENT)
Dept: MRI IMAGING | Age: 44
Discharge: HOME | End: 2019-09-03
Attending: PHYSICIAN ASSISTANT
Payer: COMMERCIAL

## 2019-09-03 DIAGNOSIS — R55 SYNCOPE, UNSPECIFIED SYNCOPE TYPE: ICD-10-CM

## 2019-09-03 DIAGNOSIS — R29.818 TRANSIENT NEUROLOGICAL SYMPTOMS: ICD-10-CM

## 2019-09-03 PROCEDURE — 70549 MR ANGIOGRAPH NECK W/O&W/DYE: CPT | Performed by: PHYSICIAN ASSISTANT

## 2019-09-03 PROCEDURE — A9575 INJ GADOTERATE MEGLUMI 0.1ML: HCPCS | Performed by: PHYSICIAN ASSISTANT

## 2019-09-06 ENCOUNTER — NURSE ONLY (OUTPATIENT)
Dept: ELECTROPHYSIOLOGY | Facility: HOSPITAL | Age: 44
End: 2019-09-06
Attending: PHYSICIAN ASSISTANT
Payer: COMMERCIAL

## 2019-09-06 ENCOUNTER — PROCEDURE VISIT (OUTPATIENT)
Dept: NEUROLOGY | Facility: CLINIC | Age: 44
End: 2019-09-06

## 2019-09-06 DIAGNOSIS — R55 SYNCOPE, UNSPECIFIED SYNCOPE TYPE: ICD-10-CM

## 2019-09-06 DIAGNOSIS — R29.818 TRANSIENT NEUROLOGICAL SYMPTOMS: Primary | ICD-10-CM

## 2019-09-06 PROCEDURE — 95816 EEG AWAKE AND DROWSY: CPT | Performed by: OTHER

## 2019-09-06 NOTE — TELEPHONE ENCOUNTER
From: Hipolito Gloria  To: Alvin Rodriguez MD  Sent: 9/5/2019 10:14 AM CDT  Subject: Prescription Question    Good morning Dr. Harini Kaufman I wasn't able to fill out my Lyrica prescription because they told me in the pharmacy I had to wait to next w

## 2019-09-11 NOTE — PROGRESS NOTES
[address] in [place]  in affiliation with [facility]  [address]  [address]  [phone]  Fax [phone]      Name: Rashid Ahmadi  9/20/1975 amplitudes. Activation Procedures:  Hyperventilation not done. Photic stimulation not done. Abnormal Activity:  There were no focal abnormalities seen, and there were no epileptiform discharges. There were no abnormal paroxysmal discharges.

## 2019-09-17 ENCOUNTER — OFFICE VISIT (OUTPATIENT)
Dept: INTERNAL MEDICINE CLINIC | Facility: CLINIC | Age: 44
End: 2019-09-17
Payer: COMMERCIAL

## 2019-09-17 ENCOUNTER — TELEPHONE (OUTPATIENT)
Dept: INTERNAL MEDICINE CLINIC | Facility: CLINIC | Age: 44
End: 2019-09-17

## 2019-09-17 VITALS
TEMPERATURE: 99 F | HEIGHT: 69 IN | DIASTOLIC BLOOD PRESSURE: 70 MMHG | BODY MASS INDEX: 26.33 KG/M2 | HEART RATE: 64 BPM | WEIGHT: 177.75 LBS | SYSTOLIC BLOOD PRESSURE: 110 MMHG | RESPIRATION RATE: 16 BRPM

## 2019-09-17 DIAGNOSIS — M25.562 CHRONIC PAIN OF LEFT KNEE: ICD-10-CM

## 2019-09-17 DIAGNOSIS — M25.561 CHRONIC PAIN OF RIGHT KNEE: ICD-10-CM

## 2019-09-17 DIAGNOSIS — G89.29 CHRONIC PAIN OF LEFT KNEE: ICD-10-CM

## 2019-09-17 DIAGNOSIS — M25.512 CHRONIC LEFT SHOULDER PAIN: ICD-10-CM

## 2019-09-17 DIAGNOSIS — M25.50 POLYARTHRALGIA: ICD-10-CM

## 2019-09-17 DIAGNOSIS — G89.29 CHRONIC PAIN OF RIGHT KNEE: ICD-10-CM

## 2019-09-17 DIAGNOSIS — M54.12 CERVICAL RADICULOPATHY: ICD-10-CM

## 2019-09-17 DIAGNOSIS — G89.29 CHRONIC LEFT SHOULDER PAIN: ICD-10-CM

## 2019-09-17 DIAGNOSIS — R55 RECURRENT SYNCOPE: Primary | ICD-10-CM

## 2019-09-17 PROCEDURE — 93000 ELECTROCARDIOGRAM COMPLETE: CPT | Performed by: INTERNAL MEDICINE

## 2019-09-17 PROCEDURE — 99214 OFFICE O/P EST MOD 30 MIN: CPT | Performed by: INTERNAL MEDICINE

## 2019-09-17 NOTE — TELEPHONE ENCOUNTER
Just an FYI:    Received PA request for Pregabalin 75 mg from [facility] in [place]. Called [facility] at [phone] to begin PA but they state rx is already approved through 03/2020 for Brand Name Lyrica.      Contacted pharmacy and asked to run [place]

## 2019-09-17 NOTE — PATIENT INSTRUCTIONS
Understanding Vasovagal Syncope  Vasovagal syncope is fainting caused by a complex nerve and blood vessel reaction in the body. It’s the most common cause of fainting. Unlike other causes of fainting, it’s not a sign of a problem with the heart or brain. When a person does faint, lying down restores blood flow to the brain. Consciousness should return fairly quickly. You might not feel normal for a little while after you faint. You might feel depressed or fatigued for a short time.  You may even feel nauseo There are many causes of syncope. Some causes are not dangerous. In older persons, unexplained syncope can be a sign of a serious infection or a heart attack.  Call 911 or seek immediate medical attention to be evaluated, especially if there has been a fall Tilt table testing takes about 60 minutes. The testing room is kept quiet and dimly lit. During the test:  · Small pads (electrodes) are put on your chest to monitor your heartbeat. · A blood pressure cuff is put on your arm.   · An IV (intravenous) line m © 0550-6689 [employer] [address]. [address] [facility], [facility] [place]. All rights reserved. This information is not intended as a substitute for professional medical care. Always follow your healthcare professional's instructions.

## 2019-09-17 NOTE — PROGRESS NOTES
Patient presents with:  Syncope: recurrent episodes since February  Other: polyarthralgias      HPI: Ana Thompson presents today for eval of recurrent episodes of syncope since February of this year.   In total, he believes he's had at least 4 episodes of passin 75 MG Oral Cap, Take 1 capsule (75 mg total) by mouth 2 (two) times daily. , Disp: 180 capsule, Rfl: 0    Social History    Tobacco Use      Smoking status: Former Smoker        Types: Cigarettes        Quit date: 2001        Years since quittin.7   [address], [facility], [address]  T: [id number]; F: [phone]     Orders Placed This Encounter      Rheumatoid Arthritis Factor      Uric Acid, Serum      Cyclic Citrullinate Peptide (CCP) antibodies      Sed Alfa Patton (Automated)      C-

## 2019-09-21 PROBLEM — M25.50 POLYARTHRALGIA: Status: ACTIVE | Noted: 2019-09-21

## 2019-09-21 PROBLEM — R55 RECURRENT SYNCOPE: Status: ACTIVE | Noted: 2019-09-21

## 2019-09-23 ENCOUNTER — PATIENT MESSAGE (OUTPATIENT)
Dept: INTERNAL MEDICINE CLINIC | Facility: CLINIC | Age: 44
End: 2019-09-23

## 2019-09-23 DIAGNOSIS — M54.12 CERVICAL RADICULOPATHY: ICD-10-CM

## 2019-09-23 DIAGNOSIS — M25.561 CHRONIC PAIN OF RIGHT KNEE: ICD-10-CM

## 2019-09-23 DIAGNOSIS — M25.512 CHRONIC LEFT SHOULDER PAIN: ICD-10-CM

## 2019-09-23 DIAGNOSIS — G89.29 CHRONIC PAIN OF RIGHT KNEE: ICD-10-CM

## 2019-09-23 DIAGNOSIS — G89.29 CHRONIC LEFT SHOULDER PAIN: ICD-10-CM

## 2019-09-23 DIAGNOSIS — G89.29 CHRONIC PAIN OF LEFT KNEE: ICD-10-CM

## 2019-09-23 DIAGNOSIS — M25.562 CHRONIC PAIN OF LEFT KNEE: ICD-10-CM

## 2019-09-23 RX ORDER — HYDROCODONE BITARTRATE AND ACETAMINOPHEN 5; 325 MG/1; MG/1
1 TABLET ORAL EVERY 8 HOURS PRN
Qty: 40 TABLET | Refills: 0 | Status: SHIPPED | OUTPATIENT
Start: 2019-09-23 | End: 2019-10-11

## 2019-09-23 NOTE — PROGRESS NOTES
Script in my outbox. Zay Ortega. Barby Hamilton MD  Diplomate, American Board of Internal Medicine  [address] [address] [address]  [facility],[address], [address], [facility], [address] [place] [address]  T: [id number]; F: [address]

## 2019-09-23 NOTE — TELEPHONE ENCOUNTER
From: Aidee Gloria  To: Minh Joya MD  Sent: 9/23/2019 11:26 AM CDT  Subject: Prescription Question    Good morning Dr. Matt Gonzalez, I finish my last Norco refill which the quantity was lower than usual, the last prescription was for only 30 this

## 2019-09-25 ENCOUNTER — OFFICE VISIT (OUTPATIENT)
Dept: PHYSICAL THERAPY | Age: 44
End: 2019-09-25
Attending: INTERNAL MEDICINE
Payer: COMMERCIAL

## 2019-09-25 DIAGNOSIS — M25.50 POLYARTHRALGIA: ICD-10-CM

## 2019-09-25 PROCEDURE — 97112 NEUROMUSCULAR REEDUCATION: CPT

## 2019-09-25 PROCEDURE — 97163 PT EVAL HIGH COMPLEX 45 MIN: CPT

## 2019-09-25 NOTE — PROGRESS NOTES
PHYSICAL THERAPY EVALUATION:   Referring Physician: Dr. Alicia Wilson  Diagnosis: polyarthralgia and central sensitization     Date of Service: 9/25/2019     PATIENT SUMMARY   Alicia Castaneda is a 38year old male who presents to therapy today with com he 'blacked out', did not remember the next few minutes when a co-worker found him on the ground and helped him to a chair. Pain was also so intense that he threw up. He also falls weekly, mostly due to pain and knee buckling.      In the past year, he has medically necessary to address the above impairments and reach functional goals. Precautions:  Fall Risk  OBJECTIVE:   Observation/Posture: pt changing positions in chair and visible discomfort with all mobility.  Pleasant and motivated to participate i wt shift; feels pulling in knees Fall Risk: yes  Gait: pt ambulates on level ground with antalgia and decreased step length R; decreased L stance time.     Today’s Treatment and Response:   Pt education was provided on exam findings, treatment diagnosis, tr better cognitions regarding their pain and decreased sensitization of their nervous system      Charges: PT Eval High Complexity, neuro re-ed x1      Total Timed Treatment: 10 min     Total Treatment Time: 55 min     Based on clinical rationale and outcome treatment limitation: None  Rehab Potential:good for stated goals due to pt motivation, relatively young age, and good family support.  May take increased time due to chronicity of sxs and previous failed treatments    FOTO: 23/100    Patient/Family/Caregiv

## 2019-09-30 ENCOUNTER — OFFICE VISIT (OUTPATIENT)
Dept: PHYSICAL THERAPY | Age: 44
End: 2019-09-30
Attending: INTERNAL MEDICINE
Payer: COMMERCIAL

## 2019-09-30 PROCEDURE — 97140 MANUAL THERAPY 1/> REGIONS: CPT

## 2019-09-30 PROCEDURE — 97110 THERAPEUTIC EXERCISES: CPT

## 2019-09-30 NOTE — PROGRESS NOTES
Dx: polyarthralgia and central sensitization             Insurance (Authorized # of Visits):   [address]           Authorizing Physician: Dr. Tasha Durant   Next MD visit: 12/17/19 Fall Risk: standard         Precautions: n/a             Subjective: Pt reprots he ha improve upper trap and cervical guarding/mm tension to WNL for decreased pain with shoulder AROM to reach into overhead shelves    · Pt will improve cervical sxs centralization to report 50% improvement in L UE pain and finger numbness  · Pt will improve s Total Timed Treatment: 58 min  Total Treatment Time: 68 min

## 2019-10-02 ENCOUNTER — OFFICE VISIT (OUTPATIENT)
Dept: PHYSICAL THERAPY | Age: 44
End: 2019-10-02
Attending: INTERNAL MEDICINE
Payer: COMMERCIAL

## 2019-10-02 PROCEDURE — 97140 MANUAL THERAPY 1/> REGIONS: CPT

## 2019-10-02 PROCEDURE — 97110 THERAPEUTIC EXERCISES: CPT

## 2019-10-02 NOTE — PROGRESS NOTES
Dx: polyarthralgia and central sensitization             Insurance (Authorized # of Visits):   [address]           Authorizing Physician: Dr. Alicia Wilson   Next MD visit: 12/17/19 Fall Risk: standard         Precautions: n/a             Subjective: Pt reports he wa improve quad strength to 5/5 to ascend 1 flight of stairs reciprocally at the end of the day and get into bed without  assist  · Pt will be independent and compliant with comprehensive HEP to maintain progress achieved in PT     Plan: attempt sidely 45 min  Total Treatment Time: 45 min

## 2019-10-07 ENCOUNTER — OFFICE VISIT (OUTPATIENT)
Dept: PHYSICAL THERAPY | Age: 44
End: 2019-10-07
Attending: INTERNAL MEDICINE
Payer: COMMERCIAL

## 2019-10-07 PROCEDURE — 97140 MANUAL THERAPY 1/> REGIONS: CPT

## 2019-10-07 PROCEDURE — 97110 THERAPEUTIC EXERCISES: CPT

## 2019-10-07 NOTE — PROGRESS NOTES
Dx: polyarthralgia and central sensitization             Insurance (Authorized # of Visits):   [address]           Authorizing Physician: Dr. Pam Heredia   Next MD visit: 12/17/19 Fall Risk: standard         Precautions: n/a             Subjective: Pt reports he we cervical guarding/mm tension to WNL for decreased pain with shoulder AROM to reach into overhead shelves -progress  · Pt will improve cervical sxs centralization to report 50% improvement in L UE pain and finger numbness - progress  · Pt will improve shoul 3x30s (light)  C2-7 L rot Gr I x20s ea; R lateral glides Gr I x10s ea  STM GAGANDEEP cervical paraspinals, UT, Pec major and minor x5  Median N Glides R and L x3 ea   Tibiofemoral distract and ant glides 3x30s  L hip lateral glides with belt Gr II   -with ER x5

## 2019-10-09 ENCOUNTER — PATIENT MESSAGE (OUTPATIENT)
Dept: INTERNAL MEDICINE CLINIC | Facility: CLINIC | Age: 44
End: 2019-10-09

## 2019-10-09 ENCOUNTER — APPOINTMENT (OUTPATIENT)
Dept: PHYSICAL THERAPY | Age: 44
End: 2019-10-09
Attending: INTERNAL MEDICINE
Payer: COMMERCIAL

## 2019-10-09 DIAGNOSIS — M25.512 CHRONIC LEFT SHOULDER PAIN: ICD-10-CM

## 2019-10-09 DIAGNOSIS — G89.29 CHRONIC PAIN OF LEFT KNEE: ICD-10-CM

## 2019-10-09 DIAGNOSIS — M25.562 CHRONIC PAIN OF LEFT KNEE: ICD-10-CM

## 2019-10-09 DIAGNOSIS — G89.29 CHRONIC LEFT SHOULDER PAIN: ICD-10-CM

## 2019-10-09 DIAGNOSIS — G89.29 CHRONIC PAIN OF RIGHT KNEE: ICD-10-CM

## 2019-10-09 DIAGNOSIS — M25.561 CHRONIC PAIN OF RIGHT KNEE: ICD-10-CM

## 2019-10-09 DIAGNOSIS — M54.12 CERVICAL RADICULOPATHY: ICD-10-CM

## 2019-10-09 RX ORDER — HYDROCODONE BITARTRATE AND ACETAMINOPHEN 5; 325 MG/1; MG/1
1 TABLET ORAL EVERY 8 HOURS PRN
Qty: 40 TABLET | Refills: 0 | Status: CANCELLED | OUTPATIENT
Start: 2019-10-09

## 2019-10-09 NOTE — TELEPHONE ENCOUNTER
From: Yahaira Gloria  To: Grayson Zabala MD  Sent: 10/9/2019 10:34 AM CDT  Subject: Prescription Question    Good morning Dr. Ginger Tesfaye just to let you know I requested a Norco refill on the [employer] lias just to keep you on the loop.  I managed to get

## 2019-10-09 NOTE — TELEPHONE ENCOUNTER
No protocol     Last refill:  9/23/2019 Hydrocodone #40 NR    LOV:   9/17/2019 Dr Yash Peralta RTC 3 months     No FOV scheduled

## 2019-10-11 RX ORDER — HYDROCODONE BITARTRATE AND ACETAMINOPHEN 5; 325 MG/1; MG/1
1 TABLET ORAL NIGHTLY PRN
Qty: 30 TABLET | Refills: 0 | Status: SHIPPED | OUTPATIENT
Start: 2019-10-11 | End: 2019-10-28

## 2019-10-11 NOTE — PROGRESS NOTES
1. Norco script printed. 2. Tell him referral placed for Dr. Darrin Norton who is a local pain doc. Carrie Meraz. Sandra Ratliff MD  Diplomate, American Board of Internal Medicine  Member, [employer] College of [address]  [address]

## 2019-10-16 ENCOUNTER — OFFICE VISIT (OUTPATIENT)
Dept: PHYSICAL THERAPY | Age: 44
End: 2019-10-16
Attending: INTERNAL MEDICINE
Payer: COMMERCIAL

## 2019-10-16 PROCEDURE — 97140 MANUAL THERAPY 1/> REGIONS: CPT

## 2019-10-16 PROCEDURE — 97110 THERAPEUTIC EXERCISES: CPT

## 2019-10-16 NOTE — PROGRESS NOTES
Dx: polyarthralgia and central sensitization             Insurance (Authorized # of Visits):   [address]           Authorizing Physician: Dr. Chelita Medina MD visit: 12/17/19 Fall Risk: standard         Precautions: n/a             Subjective: Pt reports that to report 50% improvement in L UE pain and finger numbness - progress  · Pt will improve shoulder strength throughout to 5/5 to improve function with mowing the lawn and carrying 10# at home and work (ie groceries)   · Pt will report improved lumbar sympto thread the needle x5 ea (hard wt bearing L)  Self Median N glide --HELD  Pullies shld flex AAROM x2 min  Wall push-up plus x10  Rows YTB x20  -Low trap pull down YTB x12 Therex  Deep neck flexor re-ed -held  Shuttle GAGANDEEP press 3 bands 1x10; 2 bands 1x10 (st Gr II 3x20s  L hip long IR/ER mob x3 min  L hip STM (hip flexor, glut med, TFL) -HELD  L gapping lumbar mobilizations LTR Gr II -HELD Manual Therapy  Prone PA T12-L4 Gr II 3x20s ea  Prone L piriformis and hip IR pin and stretch x5 min    STM L cervical par

## 2019-10-21 ENCOUNTER — OFFICE VISIT (OUTPATIENT)
Dept: PHYSICAL THERAPY | Age: 44
End: 2019-10-21
Attending: INTERNAL MEDICINE
Payer: COMMERCIAL

## 2019-10-21 PROCEDURE — 97110 THERAPEUTIC EXERCISES: CPT

## 2019-10-21 PROCEDURE — 97140 MANUAL THERAPY 1/> REGIONS: CPT

## 2019-10-21 NOTE — PROGRESS NOTES
Dx: polyarthralgia and central sensitization             Insurance (Authorized # of Visits):   [address]           Authorizing Physician: Dr. Alicia Wilson   Next MD visit: 12/17/19 Fall Risk: standard         Precautions: n/a             Subjective: Pt reports that improve cervical ROM as well as promote upright posturing and decreased pain with sitting at desk >60 minutes -progress  · Pt will improve upper trap and cervical guarding/mm tension to WNL for decreased pain with shoulder AROM to reach into overhead shelv thread the needle x7 ea (hard)   Self Median N glide -x10 ea (feels 'loose' on L)  Pullies shld flex AAROM L -hold  Wall push-up plus x10  Rows YTB 2x8 (fatiguing) Therex  Deep neck flexor re-ed -held  Shuttle GAGANDEEP press 3 bands 2x10   Lateral Lunge 2 UE x1 (light)  C2-7 L rot Gr I 2x10s ea; R lateral glides Gr I 2x10s ea  STM GAGANDEEP cervical paraspinals, UT, Pec major and minor x5  Median N Glides R and L x3 ea   Tibiofemoral distract and ant glides 3x30s ea  L hip lateral glides Gr II 3x20s  L hip long IR/ER m

## 2019-10-22 ENCOUNTER — APPOINTMENT (OUTPATIENT)
Dept: LAB | Facility: HOSPITAL | Age: 44
End: 2019-10-22
Attending: INTERNAL MEDICINE
Payer: COMMERCIAL

## 2019-10-22 ENCOUNTER — HOSPITAL ENCOUNTER (OUTPATIENT)
Dept: CV DIAGNOSTICS | Facility: HOSPITAL | Age: 44
Discharge: HOME | End: 2019-10-22
Attending: INTERNAL MEDICINE
Payer: COMMERCIAL

## 2019-10-22 DIAGNOSIS — R55 RECURRENT SYNCOPE: ICD-10-CM

## 2019-10-22 PROCEDURE — 93306 TTE W/DOPPLER COMPLETE: CPT | Performed by: INTERNAL MEDICINE

## 2019-10-22 PROCEDURE — 86200 CCP ANTIBODY: CPT | Performed by: INTERNAL MEDICINE

## 2019-10-22 PROCEDURE — 86038 ANTINUCLEAR ANTIBODIES: CPT | Performed by: INTERNAL MEDICINE

## 2019-10-22 PROCEDURE — 86235 NUCLEAR ANTIGEN ANTIBODY: CPT | Performed by: INTERNAL MEDICINE

## 2019-10-22 PROCEDURE — 93225 XTRNL ECG REC<48 HRS REC: CPT | Performed by: INTERNAL MEDICINE

## 2019-10-22 PROCEDURE — 84550 ASSAY OF BLOOD/URIC ACID: CPT | Performed by: INTERNAL MEDICINE

## 2019-10-22 PROCEDURE — 85652 RBC SED RATE AUTOMATED: CPT | Performed by: INTERNAL MEDICINE

## 2019-10-22 PROCEDURE — 93226 XTRNL ECG REC<48 HR SCAN A/R: CPT | Performed by: INTERNAL MEDICINE

## 2019-10-22 PROCEDURE — 87389 HIV-1 AG W/HIV-1&-2 AB AG IA: CPT | Performed by: INTERNAL MEDICINE

## 2019-10-22 PROCEDURE — 86431 RHEUMATOID FACTOR QUANT: CPT | Performed by: INTERNAL MEDICINE

## 2019-10-22 PROCEDURE — 86225 DNA ANTIBODY NATIVE: CPT | Performed by: INTERNAL MEDICINE

## 2019-10-22 PROCEDURE — 36415 COLL VENOUS BLD VENIPUNCTURE: CPT | Performed by: INTERNAL MEDICINE

## 2019-10-22 PROCEDURE — 86140 C-REACTIVE PROTEIN: CPT | Performed by: INTERNAL MEDICINE

## 2019-10-22 PROCEDURE — 93227 XTRNL ECG REC<48 HR R&I: CPT | Performed by: INTERNAL MEDICINE

## 2019-10-23 ENCOUNTER — OFFICE VISIT (OUTPATIENT)
Dept: PHYSICAL THERAPY | Age: 44
End: 2019-10-23
Attending: INTERNAL MEDICINE
Payer: COMMERCIAL

## 2019-10-23 DIAGNOSIS — G89.29 CHRONIC PAIN OF LEFT KNEE: ICD-10-CM

## 2019-10-23 DIAGNOSIS — G89.29 CHRONIC PAIN OF RIGHT KNEE: ICD-10-CM

## 2019-10-23 DIAGNOSIS — M25.561 CHRONIC PAIN OF RIGHT KNEE: ICD-10-CM

## 2019-10-23 DIAGNOSIS — Z79.899 ON PRE-EXPOSURE PROPHYLAXIS FOR HIV: Primary | ICD-10-CM

## 2019-10-23 DIAGNOSIS — M54.12 CERVICAL RADICULOPATHY: ICD-10-CM

## 2019-10-23 DIAGNOSIS — Z51.81 THERAPEUTIC DRUG MONITORING: ICD-10-CM

## 2019-10-23 DIAGNOSIS — G89.29 CHRONIC LEFT SHOULDER PAIN: ICD-10-CM

## 2019-10-23 DIAGNOSIS — M25.512 CHRONIC LEFT SHOULDER PAIN: ICD-10-CM

## 2019-10-23 DIAGNOSIS — M25.562 CHRONIC PAIN OF LEFT KNEE: ICD-10-CM

## 2019-10-23 PROCEDURE — 97140 MANUAL THERAPY 1/> REGIONS: CPT

## 2019-10-23 PROCEDURE — 97110 THERAPEUTIC EXERCISES: CPT

## 2019-10-23 NOTE — PROGRESS NOTES
Dx: polyarthralgia and central sensitization             Insurance (Authorized # of Visits):   [address]           Authorizing Physician: Dr. Na Zheng   Next MD visit: 12/17/19; psychiatrist Nov 4 Fall Risk: standard         Precautions: n/a             Jeanette Gloria · Pt will report improved lumbar symptom centralization to 50% improvement in L LE radiating pain and numbness to report <2 falls/month   · Pt will demonstrate improved core strength to be able to perform floor transfers and walk 30 min (ie window shop w 90 deg ER step x10 ea (2 UE)  Clams-2x8 ea   Seated YTB OKC hip IR and ER 1x10 ea R/L (min range L IR<ER)  Quad set 5s hold x10  SLR x10 ea (very hard L; v/c to avoid breath holding)  Bridges -HELD  Pullies shld flex AAROM Held  Rows YTB xHeld  -Low trap p lumbar mobilizations LTR Gr II -HELD Manual Therapy  Prone PA T12-L4 Gr II 3x20s ea  Prone L piriformis and hip IR pin and stretch x5 min    STM L cervical paraspinals, UT, LS, rhomboids, scapular boarder and thoracic paraspinals minimal pressure x3 min -t

## 2019-10-24 ENCOUNTER — TELEPHONE (OUTPATIENT)
Dept: NEUROLOGY | Facility: CLINIC | Age: 44
End: 2019-10-24

## 2019-10-24 DIAGNOSIS — R29.818 TRANSIENT NEUROLOGICAL SYMPTOMS: Primary | ICD-10-CM

## 2019-10-24 NOTE — TELEPHONE ENCOUNTER
Hydrocodone acetaminophen 5-325 mg 1 tab prn filled 10-11-91 30 with 0 refills     LOV 9-17-19   RTC 3 months.     No upcoming apt on file   Labs 7-23-19

## 2019-10-24 NOTE — TELEPHONE ENCOUNTER
Left voice-mail message for patient to call back. Discussed with Dr. Jordi Brown and could start him on depakote for the headaches.

## 2019-10-25 RX ORDER — DIVALPROEX SODIUM 250 MG/1
500 TABLET, EXTENDED RELEASE ORAL DAILY
Qty: 30 TABLET | Refills: 3 | Status: SHIPPED | OUTPATIENT
Start: 2019-10-25 | End: 2019-10-28

## 2019-10-25 RX ORDER — HYDROCODONE BITARTRATE AND ACETAMINOPHEN 5; 325 MG/1; MG/1
1 TABLET ORAL NIGHTLY PRN
Qty: 30 TABLET | Refills: 0 | OUTPATIENT
Start: 2019-10-25

## 2019-10-25 NOTE — TELEPHONE ENCOUNTER
Dr. Dov Clarke referred patient to Pain Management - Dr. Melanie Nelson - did patient schedule appointment with them? As discussed previously, patient needs to discuss long-term narcotic therapy with a pain management group.

## 2019-10-25 NOTE — TELEPHONE ENCOUNTER
Discussed with pt MD's recommendation to start on Depakote ER 500mg daily. Pt verbalized understanding and did not have any further questions. Script sent to the pharmacy. Verified pharmacy with pt.

## 2019-10-25 NOTE — TELEPHONE ENCOUNTER
hydrocodone-acetaminophen 5-325 mg 1 tab prn filled 10-11-19 30 with 0 refills     Dr. Yamini Badillo message was relayed back to pt. Patient states he chapman an pt with Dr. Evelin Marshall today 10-25-19.

## 2019-10-25 NOTE — TELEPHONE ENCOUNTER
Spoke to the son and relayed the information that they could start the patient on Depakote for the headaches. Pt's son verbalized understanding and said would talk to the MD about it at their appt on Monday.

## 2019-10-28 ENCOUNTER — OFFICE VISIT (OUTPATIENT)
Dept: NEUROLOGY | Facility: CLINIC | Age: 44
End: 2019-10-28
Payer: COMMERCIAL

## 2019-10-28 VITALS
SYSTOLIC BLOOD PRESSURE: 113 MMHG | RESPIRATION RATE: 16 BRPM | HEART RATE: 78 BPM | DIASTOLIC BLOOD PRESSURE: 79 MMHG | BODY MASS INDEX: 27.4 KG/M2 | HEIGHT: 69 IN | WEIGHT: 185 LBS

## 2019-10-28 DIAGNOSIS — R29.818 TRANSIENT NEUROLOGICAL SYMPTOMS: Primary | ICD-10-CM

## 2019-10-28 DIAGNOSIS — G43.009 MIGRAINE WITHOUT AURA AND WITHOUT STATUS MIGRAINOSUS, NOT INTRACTABLE: Primary | ICD-10-CM

## 2019-10-28 DIAGNOSIS — R29.818 TRANSIENT NEUROLOGICAL SYMPTOMS: ICD-10-CM

## 2019-10-28 DIAGNOSIS — R29.898 LEFT ARM WEAKNESS: ICD-10-CM

## 2019-10-28 DIAGNOSIS — M25.512 CHRONIC LEFT SHOULDER PAIN: ICD-10-CM

## 2019-10-28 DIAGNOSIS — G89.29 CHRONIC LEFT SHOULDER PAIN: ICD-10-CM

## 2019-10-28 PROCEDURE — 99214 OFFICE O/P EST MOD 30 MIN: CPT | Performed by: PHYSICIAN ASSISTANT

## 2019-10-28 RX ORDER — DIVALPROEX SODIUM 250 MG/1
500 TABLET, EXTENDED RELEASE ORAL DAILY
Qty: 60 TABLET | Refills: 2 | Status: SHIPPED | OUTPATIENT
Start: 2019-10-28 | End: 2019-12-05

## 2019-10-28 NOTE — TELEPHONE ENCOUNTER
Medication: Duloxetine HCL 20 MG    Date of last refill: 03/25/2019 (#60/0)  [place]     Last office visit: 7/1/2019  Due back to clinic per last office note: 6 weeks  Date next office visit scheduled:    Future Appointm

## 2019-10-28 NOTE — TELEPHONE ENCOUNTER
Patient had sent [employer] message requesting Cymbalta refill which he had not taken earlier, would like to restart again.

## 2019-10-28 NOTE — PROGRESS NOTES
[facility] with [address]  9/20/1975  Primary Care Provider:  James Mcaculey MD    10/28/2019  Accompanied visit: No    38year old male patient being seen for: Fredis cervical vertebral or carotid arteries. No evidence of hemodynamically significant carotid stenosis by NASCET criteria.       MRI Brain(2/9/19)  FINDINGS:          CSF SPACES:   The ventricles, cisterns, and sulci are commensurate in caliber and appropriate L1-L2 anteriorly and L3-L4 posteriorly. 5. Lesser incidental findings as above.       Review of Systems:  Review of Systems:  Denies systemic symptoms     No CP or SOB. No GI or  symptoms. Relevant Neuro as noted above.       Medications:      Current O left shoulder pain  Left arm weakness    Discussion plus Diagnostics & Treatment Orders:    Symptoms seem to be consistent complicated migraine. Will have him continue the depakote and see if headaches improve.  His EEG was normal but if symptoms continue o

## 2019-10-30 RX ORDER — DULOXETIN HYDROCHLORIDE 20 MG/1
20 CAPSULE, DELAYED RELEASE ORAL EVERY 12 HOURS
Qty: 60 CAPSULE | Refills: 0 | OUTPATIENT
Start: 2019-10-30 | End: 2019-11-29

## 2019-11-04 ENCOUNTER — TELEPHONE (OUTPATIENT)
Dept: PHYSICAL THERAPY | Age: 44
End: 2019-11-04

## 2019-11-06 ENCOUNTER — TELEPHONE (OUTPATIENT)
Dept: SURGERY | Facility: CLINIC | Age: 44
End: 2019-11-06

## 2019-11-06 ENCOUNTER — TELEPHONE (OUTPATIENT)
Dept: NEUROLOGY | Facility: CLINIC | Age: 44
End: 2019-11-06

## 2019-11-06 NOTE — TELEPHONE ENCOUNTER
Left voice-mail message informing patient that MRI of the left shoulder was denied and that they would require him to do PT for 6 weeks and then we could re-order it. I have asked him to call back and let me know if he would like to proceed with Pt.

## 2019-11-06 NOTE — TELEPHONE ENCOUNTER
MRI shoulder denial reason: We have determined the following is/are not medically necessary       We cannot approve this request. Your records show that you have shoulder pain.  The reason this request cannot be approved is because guidelines support sriram

## 2019-11-20 ENCOUNTER — PATIENT MESSAGE (OUTPATIENT)
Dept: INTERNAL MEDICINE CLINIC | Facility: CLINIC | Age: 44
End: 2019-11-20

## 2019-11-20 DIAGNOSIS — G89.29 CHRONIC LEFT SHOULDER PAIN: ICD-10-CM

## 2019-11-20 DIAGNOSIS — G89.29 CHRONIC PAIN OF RIGHT KNEE: ICD-10-CM

## 2019-11-20 DIAGNOSIS — M25.562 CHRONIC PAIN OF LEFT KNEE: ICD-10-CM

## 2019-11-20 DIAGNOSIS — G89.29 CHRONIC PAIN OF LEFT KNEE: ICD-10-CM

## 2019-11-20 DIAGNOSIS — M25.512 CHRONIC LEFT SHOULDER PAIN: ICD-10-CM

## 2019-11-20 DIAGNOSIS — M25.561 CHRONIC PAIN OF RIGHT KNEE: ICD-10-CM

## 2019-11-20 DIAGNOSIS — M54.12 CERVICAL RADICULOPATHY: ICD-10-CM

## 2019-11-20 DIAGNOSIS — Z79.899 ON PRE-EXPOSURE PROPHYLAXIS FOR HIV: ICD-10-CM

## 2019-11-20 RX ORDER — EMTRICITABINE AND TENOFOVIR DISOPROXIL FUMARATE 200; 300 MG/1; MG/1
1 TABLET, FILM COATED ORAL DAILY
Qty: 90 TABLET | Refills: 0 | Status: SHIPPED | OUTPATIENT
Start: 2019-11-20 | End: 2020-02-21

## 2019-11-20 RX ORDER — HYDROCODONE BITARTRATE AND ACETAMINOPHEN 5; 325 MG/1; MG/1
1 TABLET ORAL EVERY 8 HOURS PRN
Qty: 30 TABLET | Refills: 0 | Status: SHIPPED | OUTPATIENT
Start: 2019-11-20 | End: 2020-03-16 | Stop reason: ALTCHOICE

## 2019-11-20 RX ORDER — PREGABALIN 75 MG/1
75 CAPSULE ORAL 2 TIMES DAILY
Qty: 180 CAPSULE | Refills: 1 | Status: SHIPPED | OUTPATIENT
Start: 2019-11-20 | End: 2021-04-08

## 2019-11-20 NOTE — TELEPHONE ENCOUNTER
Lyrica and Truvada refilled. He needs to get repeat HIV testing in 2 months. Norco refilled for now - but patient should be advised that he needs to follow up with Pain Management for further narcotic refills. Dr. Kristan Avalos referred him to Dr. Sena Curran last month.

## 2019-11-20 NOTE — TELEPHONE ENCOUNTER
Failed protocol     Last refill:  9/20/2019 Lyrica 75 mg #180 NR  9/6/2019 Hydrocodone 5-325 mg #30 NR  7/26/2019 Truvada 200-300 mg #90 1R    LOV:   9/17/2019 Dr Everton Swann RTC 3 months  No FOV schecduled

## 2019-12-05 DIAGNOSIS — R29.818 TRANSIENT NEUROLOGICAL SYMPTOMS: ICD-10-CM

## 2019-12-05 RX ORDER — DIVALPROEX SODIUM 250 MG/1
500 TABLET, EXTENDED RELEASE ORAL DAILY
Qty: 60 TABLET | Refills: 5 | Status: SHIPPED | OUTPATIENT
Start: 2019-12-05 | End: 2020-07-20

## 2019-12-05 NOTE — TELEPHONE ENCOUNTER
Medication: Divalproex 250 mg    Date of last refill: 10/28/2019 with 2 addt refills      Last office visit: 10/28/2019  Due back to clinic per last office note:  RTN in 3 months  Date next office visit scheduled:  No future appointments.     Last OV note r

## 2019-12-30 DIAGNOSIS — M54.12 CERVICAL RADICULOPATHY: ICD-10-CM

## 2019-12-30 DIAGNOSIS — G89.29 CHRONIC PAIN OF LEFT KNEE: ICD-10-CM

## 2019-12-30 DIAGNOSIS — G89.29 CHRONIC LEFT SHOULDER PAIN: ICD-10-CM

## 2019-12-30 DIAGNOSIS — M25.512 CHRONIC LEFT SHOULDER PAIN: ICD-10-CM

## 2019-12-30 DIAGNOSIS — M25.562 CHRONIC PAIN OF LEFT KNEE: ICD-10-CM

## 2019-12-30 DIAGNOSIS — G89.29 CHRONIC PAIN OF RIGHT KNEE: ICD-10-CM

## 2019-12-30 DIAGNOSIS — R29.818 TRANSIENT NEUROLOGICAL SYMPTOMS: ICD-10-CM

## 2019-12-30 DIAGNOSIS — M25.561 CHRONIC PAIN OF RIGHT KNEE: ICD-10-CM

## 2019-12-30 RX ORDER — DIVALPROEX SODIUM 250 MG/1
500 TABLET, EXTENDED RELEASE ORAL DAILY
Qty: 60 TABLET | Refills: 5 | OUTPATIENT
Start: 2019-12-30

## 2019-12-30 RX ORDER — HYDROCODONE BITARTRATE AND ACETAMINOPHEN 5; 325 MG/1; MG/1
1 TABLET ORAL EVERY 8 HOURS PRN
Qty: 30 TABLET | Refills: 0 | OUTPATIENT
Start: 2019-12-30

## 2019-12-30 NOTE — TELEPHONE ENCOUNTER
HYDROcodone-acetaminophen 5-325 MG Oral Tab           Per  on 11/20/2019:   Lyrica and Truvada refilled. He needs to get repeat HIV testing in 2 months.   Norco refilled for now - but patient should be advised that he needs to follow up with Pain

## 2019-12-31 DIAGNOSIS — R29.818 TRANSIENT NEUROLOGICAL SYMPTOMS: ICD-10-CM

## 2020-01-02 RX ORDER — DIVALPROEX SODIUM 250 MG/1
500 TABLET, EXTENDED RELEASE ORAL DAILY
Qty: 60 TABLET | Refills: 5 | OUTPATIENT
Start: 2020-01-02

## 2020-01-16 DIAGNOSIS — K59.00 CONSTIPATION, UNSPECIFIED CONSTIPATION TYPE: ICD-10-CM

## 2020-01-16 RX ORDER — LACTULOSE 10 G/15ML
SOLUTION ORAL; RECTAL
Qty: 473 ML | Refills: 0 | Status: SHIPPED | OUTPATIENT
Start: 2020-01-16 | End: 2020-03-20

## 2020-01-16 NOTE — TELEPHONE ENCOUNTER
Failed protocol     Last refill:  7/18/2019 Lactulose 30 mL q 8 hours prn constipation    LOV:   9/17/2019 Dr Adama Aragon RTC 3 months  FOV scheduled 2/18/2020

## 2020-02-21 DIAGNOSIS — Z79.899 ON PRE-EXPOSURE PROPHYLAXIS FOR HIV: ICD-10-CM

## 2020-02-21 RX ORDER — DEXAMETHASONE 0.75 MG/1
TABLET ORAL
Qty: 90 TABLET | Refills: 0 | Status: SHIPPED | OUTPATIENT
Start: 2020-02-21 | End: 2020-07-20

## 2020-02-21 NOTE — TELEPHONE ENCOUNTER
Patient needs updated HIV testing (has to be done every 3 months while on Truvada). Order was already placed in January.   He does not have to fast.

## 2020-02-21 NOTE — TELEPHONE ENCOUNTER
No protocol - Mychart message sent to pt to contact office to schedule appointment. Requesting emtricitabine-tenofovir (TRUVADA) 200-300 MG Oral Tab  LOV: 9/17/19 by MG  RTC: 3 months  Last Relevant Labs: 10/22/19  Filled: 11/20/19 #90 with 0 refills    No future appointments.

## 2020-03-14 ENCOUNTER — PATIENT MESSAGE (OUTPATIENT)
Dept: INTERNAL MEDICINE CLINIC | Facility: CLINIC | Age: 45
End: 2020-03-14

## 2020-03-16 ENCOUNTER — TELEPHONE (OUTPATIENT)
Dept: INTERNAL MEDICINE CLINIC | Facility: CLINIC | Age: 45
End: 2020-03-16

## 2020-03-16 ENCOUNTER — OFFICE VISIT (OUTPATIENT)
Dept: INTERNAL MEDICINE CLINIC | Facility: CLINIC | Age: 45
End: 2020-03-16
Payer: COMMERCIAL

## 2020-03-16 VITALS — SYSTOLIC BLOOD PRESSURE: 102 MMHG | TEMPERATURE: 98 F | DIASTOLIC BLOOD PRESSURE: 70 MMHG | HEART RATE: 88 BPM

## 2020-03-16 DIAGNOSIS — G89.29 CHRONIC LEFT HIP PAIN: Primary | ICD-10-CM

## 2020-03-16 DIAGNOSIS — M25.552 CHRONIC LEFT HIP PAIN: Primary | ICD-10-CM

## 2020-03-16 PROCEDURE — 99214 OFFICE O/P EST MOD 30 MIN: CPT | Performed by: INTERNAL MEDICINE

## 2020-03-16 RX ORDER — ALPRAZOLAM 0.5 MG/1
TABLET ORAL
COMMUNITY
Start: 2020-03-09

## 2020-03-16 RX ORDER — TIZANIDINE 4 MG/1
TABLET ORAL
COMMUNITY

## 2020-03-16 RX ORDER — DULOXETIN HYDROCHLORIDE 60 MG/1
60 CAPSULE, DELAYED RELEASE ORAL 3 TIMES DAILY
Refills: 0 | COMMUNITY
Start: 2019-11-05

## 2020-03-16 RX ORDER — PRAZOSIN HYDROCHLORIDE 1 MG/1
CAPSULE ORAL
COMMUNITY
Start: 2020-02-27 | End: 2020-07-20

## 2020-03-16 RX ORDER — TRAZODONE HYDROCHLORIDE 50 MG/1
TABLET ORAL
COMMUNITY
Start: 2020-02-27

## 2020-03-16 NOTE — TELEPHONE ENCOUNTER
Zuhair Main is referring patient to [facility]  Phone: [phone]  Dr. Erica Dick MD call patient unable to leave message voice mail full.

## 2020-03-16 NOTE — PROGRESS NOTES
Patient presents with:   Other: chronic L hip pain      HPI: The patient presents for chronic L hip pain evaluation:  Onset/Duration = multiple years and progressive  Location = L hip  Radiation = Groin  Mechanism of injury = Stemming from L leg surgery pankaj (LYRICA) 75 MG Oral Cap, Take 1 capsule (75 mg total) by mouth 2 (two) times daily. , Disp: 180 capsule, Rfl: 1  •  TRUVADA 200-300 MG Oral Tab, TAKE 1 TABLET BY MOUTH DAILY (Patient not taking: Reported on 3/16/2020), Disp: 90 tablet, Rfl: 0  •  GENERLAC 1 HIPS, LEFT (CPT=73721)  ORTHOPEDIC - INTERNAL  XR HIP + PELVIS MIN 4 VIEWS LEFT (CPT=73503)

## 2020-03-17 NOTE — TELEPHONE ENCOUNTER
From: Linda Gloria  To: Otsi Dowd MD  Sent: 3/14/2020 8:34 AM CDT  Subject: Prescription Question    Good morning doctor.  My psychiatrist recommended to stop taking the Depakote because it was making to affect my behavior, I've been off f

## 2020-03-20 DIAGNOSIS — K59.00 CONSTIPATION, UNSPECIFIED CONSTIPATION TYPE: ICD-10-CM

## 2020-03-20 RX ORDER — LACTULOSE 10 G/15ML
SOLUTION ORAL
Qty: 473 ML | Refills: 0 | Status: SHIPPED | OUTPATIENT
Start: 2020-03-20 | End: 2020-03-24

## 2020-03-20 NOTE — TELEPHONE ENCOUNTER
LACTULOSE 10 GM/15ML Oral Solution    Last OV relevant to medication: 3-    Last refill date: n/a    When pt was asked to return for OV: none noted     Upcoming appt/reason: none scheduled     Labs: 10- #uric acid

## 2020-03-24 DIAGNOSIS — K59.00 CONSTIPATION, UNSPECIFIED CONSTIPATION TYPE: ICD-10-CM

## 2020-03-24 RX ORDER — LACTULOSE 10 G/15ML
SOLUTION ORAL
Qty: 473 ML | Refills: 0 | Status: SHIPPED | OUTPATIENT
Start: 2020-03-24

## 2020-03-24 NOTE — TELEPHONE ENCOUNTER
LACTULOSE 10GM/15ML SOLUTION    Last OV relevant to medication: 3/16/2020  Last refill date: 3/20/2020     #/refills: #473 mL w. 0 refills   When pt was asked to return for OV: none   Upcoming appt/reason: no future appointments

## 2020-07-13 ENCOUNTER — HOSPITAL ENCOUNTER (OUTPATIENT)
Dept: MRI IMAGING | Age: 45
End: 2020-07-13
Attending: INTERNAL MEDICINE
Payer: COMMERCIAL

## 2020-07-13 ENCOUNTER — E-VISIT (OUTPATIENT)
Dept: FAMILY MEDICINE CLINIC | Facility: CLINIC | Age: 45
End: 2020-07-13

## 2020-07-13 ENCOUNTER — HOSPITAL ENCOUNTER (OUTPATIENT)
Dept: GENERAL RADIOLOGY | Age: 45
Discharge: HOME | End: 2020-07-13
Attending: INTERNAL MEDICINE
Payer: COMMERCIAL

## 2020-07-13 ENCOUNTER — LAB ENCOUNTER (OUTPATIENT)
Dept: LAB | Age: 45
End: 2020-07-13
Attending: INTERNAL MEDICINE
Payer: COMMERCIAL

## 2020-07-13 DIAGNOSIS — M25.552 CHRONIC LEFT HIP PAIN: ICD-10-CM

## 2020-07-13 DIAGNOSIS — G89.29 CHRONIC LEFT HIP PAIN: ICD-10-CM

## 2020-07-13 DIAGNOSIS — Z79.899 ON PRE-EXPOSURE PROPHYLAXIS FOR HIV: ICD-10-CM

## 2020-07-13 DIAGNOSIS — Z20.2 POSSIBLE EXPOSURE TO STD: ICD-10-CM

## 2020-07-13 DIAGNOSIS — Z51.81 THERAPEUTIC DRUG MONITORING: ICD-10-CM

## 2020-07-13 DIAGNOSIS — Z02.9 ENCOUNTERS FOR ADMINISTRATIVE PURPOSES: Primary | ICD-10-CM

## 2020-07-13 LAB — T PALLIDUM AB SER QL IA: NONREACTIVE

## 2020-07-13 PROCEDURE — 87389 HIV-1 AG W/HIV-1&-2 AB AG IA: CPT

## 2020-07-13 PROCEDURE — 73502 X-RAY EXAM HIP UNI 2-3 VIEWS: CPT | Performed by: INTERNAL MEDICINE

## 2020-07-13 PROCEDURE — 36415 COLL VENOUS BLD VENIPUNCTURE: CPT

## 2020-07-13 PROCEDURE — 86780 TREPONEMA PALLIDUM: CPT

## 2020-07-13 PROCEDURE — 87591 N.GONORRHOEAE DNA AMP PROB: CPT

## 2020-07-13 PROCEDURE — 87491 CHLMYD TRACH DNA AMP PROBE: CPT

## 2020-07-14 LAB
C TRACH DNA SPEC QL NAA+PROBE: NEGATIVE
N GONORRHOEA DNA SPEC QL NAA+PROBE: NEGATIVE

## 2020-07-14 NOTE — PROGRESS NOTES
Vianney Ramirez is a 38year old male. HPI:   See answers to questions above.     Current Outpatient Medications   Medication Sig Dispense Refill   • Sildenafil Citrate 25 MG Oral Tab Take 1 tablet (25 mg total) by mouth daily as needed for Erec DENERVATION OF CERVICAL MEDIAL BRANCH Right 6/12/2019    Performed by Andrzej Soto MD at [address] Left 6/5/2019    Performed by Andrzej Soto MD at [facility] ENDOSCOPY      Family History   Problem Relat

## 2020-07-15 ENCOUNTER — TELEPHONE (OUTPATIENT)
Dept: INTERNAL MEDICINE CLINIC | Facility: CLINIC | Age: 45
End: 2020-07-15

## 2020-07-15 ENCOUNTER — E-VISIT (OUTPATIENT)
Dept: FAMILY MEDICINE CLINIC | Facility: CLINIC | Age: 45
End: 2020-07-15

## 2020-07-15 DIAGNOSIS — M54.10 RADICULOPATHY, UNSPECIFIED SPINAL REGION: Primary | ICD-10-CM

## 2020-07-15 DIAGNOSIS — G89.29 CHRONIC PAIN OF RIGHT KNEE: ICD-10-CM

## 2020-07-15 DIAGNOSIS — M25.561 CHRONIC PAIN OF RIGHT KNEE: ICD-10-CM

## 2020-07-15 DIAGNOSIS — M25.512 CHRONIC LEFT SHOULDER PAIN: ICD-10-CM

## 2020-07-15 DIAGNOSIS — M25.562 CHRONIC PAIN OF LEFT KNEE: ICD-10-CM

## 2020-07-15 DIAGNOSIS — G89.29 CHRONIC LEFT SHOULDER PAIN: ICD-10-CM

## 2020-07-15 DIAGNOSIS — G89.29 CHRONIC PAIN OF LEFT KNEE: ICD-10-CM

## 2020-07-15 DIAGNOSIS — Z02.9 ADMINISTRATIVE ENCOUNTER: Primary | ICD-10-CM

## 2020-07-15 NOTE — TELEPHONE ENCOUNTER
Order signed. Tereza Lerner. Donya Alexandre MD  Diplomate, American Board of Internal Medicine  Member, American College of [address] [facility]  [address], [address], [address]  T: [phone]; F: James 5

## 2020-07-15 NOTE — TELEPHONE ENCOUNTER
[address] [place] [address] called requesting new referral to be placed for Dr. Perico Cristobal - pain management.  Previous referral has been     Ph# [phone]  Fx# [phone]

## 2020-07-20 ENCOUNTER — OFFICE VISIT (OUTPATIENT)
Dept: INTERNAL MEDICINE CLINIC | Facility: CLINIC | Age: 45
End: 2020-07-20
Payer: COMMERCIAL

## 2020-07-20 VITALS
OXYGEN SATURATION: 98 % | SYSTOLIC BLOOD PRESSURE: 110 MMHG | RESPIRATION RATE: 16 BRPM | DIASTOLIC BLOOD PRESSURE: 70 MMHG | HEIGHT: 70 IN | BODY MASS INDEX: 28.35 KG/M2 | WEIGHT: 198 LBS | HEART RATE: 104 BPM | TEMPERATURE: 98 F

## 2020-07-20 DIAGNOSIS — Z12.5 SCREENING FOR MALIGNANT NEOPLASM OF PROSTATE: ICD-10-CM

## 2020-07-20 DIAGNOSIS — N52.2 DRUG-INDUCED ERECTILE DYSFUNCTION: ICD-10-CM

## 2020-07-20 DIAGNOSIS — M25.552 LEFT HIP PAIN: ICD-10-CM

## 2020-07-20 DIAGNOSIS — Z00.00 ENCOUNTER FOR PREVENTATIVE ADULT HEALTH CARE EXAMINATION: Primary | ICD-10-CM

## 2020-07-20 DIAGNOSIS — E55.9 VITAMIN D DEFICIENCY: ICD-10-CM

## 2020-07-20 PROCEDURE — 3008F BODY MASS INDEX DOCD: CPT | Performed by: INTERNAL MEDICINE

## 2020-07-20 PROCEDURE — 3074F SYST BP LT 130 MM HG: CPT | Performed by: INTERNAL MEDICINE

## 2020-07-20 PROCEDURE — 99396 PREV VISIT EST AGE 40-64: CPT | Performed by: INTERNAL MEDICINE

## 2020-07-20 PROCEDURE — 3078F DIAST BP <80 MM HG: CPT | Performed by: INTERNAL MEDICINE

## 2020-07-20 RX ORDER — SILDENAFIL 25 MG/1
25 TABLET, FILM COATED ORAL
Qty: 30 TABLET | Refills: 2 | Status: SHIPPED | OUTPATIENT
Start: 2020-07-20 | End: 2020-09-02

## 2020-07-20 NOTE — PATIENT INSTRUCTIONS
- Get blood work done. Make sure you are fasting. Get done between 7 AM - 9 AM.  Call [phone] to schedule lab appointment or schedule online at [facility]  [facility]. [facility]  - Follow up with Dr. Augustus Fabian (Orthopedic surgeon) for hip pain/x-ray results  [address]

## 2020-07-20 NOTE — PROGRESS NOTES
Guillaume Zambrano is a 38year old male. HPI:   No chief complaint on file. Patient presents for CPX/wellness examination.   Diet: Taking meal replacement shakes in the morning, water, replacement shakes at noon, crystal light in the afternoon Particles, Take 60 mg by mouth 3 (three) times daily. , Disp: , Rfl: 0  •  tapentadol HCl (NUCYNTA) 50 MG Oral Tab, Nucynta 50 mg tablet  Take 1 tablet twice a day by oral route as needed for 30 days. , Disp: , Rfl:   •  tiZANidine HCl 4 MG Oral Tab, tizan no wheezing/rubs  CARDIO: RRR without murmurs. No clubbing, cyanosis or edema.   GI: soft non tender nondistended no hepatosplenomegaly, bowel sounds throughout  NEURO: CN II-XII intact, 5/5 strength all extremities  PSYCH: pleasant, appropriate mood and a (Psychiatry)  Marylene Kid as Clinical Therapist  Simba Villeda MD as Consulting Physician (Anesthesiology Pain Medicine)  The patient indicates understanding of these issues and agrees to the plan.   The patient is asked to return to clinic in 6 mon

## 2020-07-28 DIAGNOSIS — N52.2 DRUG-INDUCED ERECTILE DYSFUNCTION: ICD-10-CM

## 2020-07-28 RX ORDER — SILDENAFIL 25 MG/1
25 TABLET, FILM COATED ORAL
Qty: 30 TABLET | Refills: 2 | Status: CANCELLED | OUTPATIENT
Start: 2020-07-28

## 2020-07-29 NOTE — TELEPHONE ENCOUNTER
Failed protocol     Last refill:  7/20/2020 Sildenafil 25 mg #30 2R    LOV:   7/20/2020 Dr Jatin Ramirez RTC 6 months  No FOV scheduled

## 2020-08-31 ENCOUNTER — TELEMEDICINE (OUTPATIENT)
Dept: INTERNAL MEDICINE CLINIC | Facility: CLINIC | Age: 45
End: 2020-08-31
Payer: COMMERCIAL

## 2020-08-31 DIAGNOSIS — B34.9 ACUTE VIRAL SYNDROME: Primary | ICD-10-CM

## 2020-08-31 DIAGNOSIS — J02.9 SORE THROAT: ICD-10-CM

## 2020-08-31 PROCEDURE — 99213 OFFICE O/P EST LOW 20 MIN: CPT | Performed by: INTERNAL MEDICINE

## 2020-08-31 NOTE — PROGRESS NOTES
Kaleb Davidson is a 38year old male here today for a telemedicine/video visit via [facility]. Yahaira Gloria verbally consents to a Virtual/Telephone Check-In service on 08/31/20.   Patient understands and accepts financial responsib throat, shortness of breath, fevers (Tmax 99.9), cough, body aches, fatigue. Will check COVID-19 testing. Recommend self-quarantine at home until results are back.   He is asking about general screening labs/STD testing - advised him to hold off on schedu

## 2020-09-02 ENCOUNTER — APPOINTMENT (OUTPATIENT)
Dept: LAB | Age: 45
End: 2020-09-02
Attending: INTERNAL MEDICINE
Payer: COMMERCIAL

## 2020-09-02 ENCOUNTER — LAB ENCOUNTER (OUTPATIENT)
Dept: LAB | Age: 45
End: 2020-09-02
Attending: INTERNAL MEDICINE
Payer: COMMERCIAL

## 2020-09-02 DIAGNOSIS — Z12.5 SCREENING FOR MALIGNANT NEOPLASM OF PROSTATE: ICD-10-CM

## 2020-09-02 DIAGNOSIS — Z00.00 ENCOUNTER FOR PREVENTATIVE ADULT HEALTH CARE EXAMINATION: ICD-10-CM

## 2020-09-02 DIAGNOSIS — N52.2 DRUG-INDUCED ERECTILE DYSFUNCTION: ICD-10-CM

## 2020-09-02 DIAGNOSIS — B34.9 ACUTE VIRAL SYNDROME: ICD-10-CM

## 2020-09-02 DIAGNOSIS — J02.9 SORE THROAT: ICD-10-CM

## 2020-09-02 DIAGNOSIS — E55.9 VITAMIN D DEFICIENCY: ICD-10-CM

## 2020-09-02 LAB
ALBUMIN SERPL-MCNC: 3.8 G/DL (ref 3.4–5)
ALBUMIN/GLOB SERPL: 0.9 {RATIO} (ref 1–2)
ALP LIVER SERPL-CCNC: 86 U/L (ref 45–117)
ALT SERPL-CCNC: 29 U/L (ref 16–61)
ANION GAP SERPL CALC-SCNC: 6 MMOL/L (ref 0–18)
AST SERPL-CCNC: 15 U/L (ref 15–37)
BASOPHILS # BLD AUTO: 0.04 X10(3) UL (ref 0–0.2)
BASOPHILS NFR BLD AUTO: 0.7 %
BILIRUB SERPL-MCNC: 0.4 MG/DL (ref 0.1–2)
BUN BLD-MCNC: 9 MG/DL (ref 7–18)
BUN/CREAT SERPL: 9.5 (ref 10–20)
CALCIUM BLD-MCNC: 9.1 MG/DL (ref 8.5–10.1)
CHLORIDE SERPL-SCNC: 105 MMOL/L (ref 98–112)
CHOLEST SMN-MCNC: 216 MG/DL (ref ?–200)
CO2 SERPL-SCNC: 27 MMOL/L (ref 21–32)
COMPLEXED PSA SERPL-MCNC: 0.88 NG/ML (ref ?–4)
CREAT BLD-MCNC: 0.95 MG/DL (ref 0.7–1.3)
DEPRECATED RDW RBC AUTO: 39.5 FL (ref 35.1–46.3)
EOSINOPHIL # BLD AUTO: 0.09 X10(3) UL (ref 0–0.7)
EOSINOPHIL NFR BLD AUTO: 1.6 %
ERYTHROCYTE [DISTWIDTH] IN BLOOD BY AUTOMATED COUNT: 12.6 % (ref 11–15)
EST. AVERAGE GLUCOSE BLD GHB EST-MCNC: 100 MG/DL (ref 68–126)
GLOBULIN PLAS-MCNC: 4.3 G/DL (ref 2.8–4.4)
GLUCOSE BLD-MCNC: 89 MG/DL (ref 70–99)
HBA1C MFR BLD HPLC: 5.1 % (ref ?–5.7)
HCT VFR BLD AUTO: 47.3 % (ref 39–53)
HDLC SERPL-MCNC: 38 MG/DL (ref 40–59)
HGB BLD-MCNC: 15.4 G/DL (ref 13–17.5)
IMM GRANULOCYTES # BLD AUTO: 0.07 X10(3) UL (ref 0–1)
IMM GRANULOCYTES NFR BLD: 1.2 %
LDLC SERPL CALC-MCNC: 149 MG/DL (ref ?–100)
LYMPHOCYTES # BLD AUTO: 1.79 X10(3) UL (ref 1–4)
LYMPHOCYTES NFR BLD AUTO: 31.9 %
M PROTEIN MFR SERPL ELPH: 8.1 G/DL (ref 6.4–8.2)
MCH RBC QN AUTO: 28.1 PG (ref 26–34)
MCHC RBC AUTO-ENTMCNC: 32.6 G/DL (ref 31–37)
MCV RBC AUTO: 86.3 FL (ref 80–100)
MONOCYTES # BLD AUTO: 0.38 X10(3) UL (ref 0.1–1)
MONOCYTES NFR BLD AUTO: 6.8 %
NEUTROPHILS # BLD AUTO: 3.24 X10 (3) UL (ref 1.5–7.7)
NEUTROPHILS # BLD AUTO: 3.24 X10(3) UL (ref 1.5–7.7)
NEUTROPHILS NFR BLD AUTO: 57.8 %
NONHDLC SERPL-MCNC: 178 MG/DL (ref ?–130)
OSMOLALITY SERPL CALC.SUM OF ELEC: 284 MOSM/KG (ref 275–295)
PATIENT FASTING Y/N/NP: YES
PATIENT FASTING Y/N/NP: YES
PLATELET # BLD AUTO: 304 10(3)UL (ref 150–450)
POTASSIUM SERPL-SCNC: 4.3 MMOL/L (ref 3.5–5.1)
RBC # BLD AUTO: 5.48 X10(6)UL (ref 4.3–5.7)
SODIUM SERPL-SCNC: 138 MMOL/L (ref 136–145)
TRIGL SERPL-MCNC: 143 MG/DL (ref 30–149)
TSI SER-ACNC: 1.47 MIU/ML (ref 0.36–3.74)
VIT D+METAB SERPL-MCNC: 22.6 NG/ML (ref 30–100)
VLDLC SERPL CALC-MCNC: 29 MG/DL (ref 0–30)
WBC # BLD AUTO: 5.6 X10(3) UL (ref 4–11)

## 2020-09-02 PROCEDURE — 80053 COMPREHEN METABOLIC PANEL: CPT

## 2020-09-02 PROCEDURE — 83036 HEMOGLOBIN GLYCOSYLATED A1C: CPT

## 2020-09-02 PROCEDURE — 86780 TREPONEMA PALLIDUM: CPT | Performed by: INTERNAL MEDICINE

## 2020-09-02 PROCEDURE — 80061 LIPID PANEL: CPT

## 2020-09-02 PROCEDURE — 85025 COMPLETE CBC W/AUTO DIFF WBC: CPT

## 2020-09-02 PROCEDURE — 84402 ASSAY OF FREE TESTOSTERONE: CPT

## 2020-09-02 PROCEDURE — 87491 CHLMYD TRACH DNA AMP PROBE: CPT | Performed by: INTERNAL MEDICINE

## 2020-09-02 PROCEDURE — 84443 ASSAY THYROID STIM HORMONE: CPT

## 2020-09-02 PROCEDURE — 82306 VITAMIN D 25 HYDROXY: CPT

## 2020-09-02 PROCEDURE — 36415 COLL VENOUS BLD VENIPUNCTURE: CPT

## 2020-09-02 PROCEDURE — 84403 ASSAY OF TOTAL TESTOSTERONE: CPT

## 2020-09-02 PROCEDURE — 87389 HIV-1 AG W/HIV-1&-2 AB AG IA: CPT | Performed by: INTERNAL MEDICINE

## 2020-09-02 PROCEDURE — 87591 N.GONORRHOEAE DNA AMP PROB: CPT | Performed by: INTERNAL MEDICINE

## 2020-09-03 LAB — SARS-COV-2 RNA RESP QL NAA+PROBE: NOT DETECTED

## 2020-09-06 LAB
TESTOSTERONE TOTAL: 453.5 NG/DL
TESTOSTERONE, FREE -MS/MS: 108.2 PG/ML

## 2020-11-02 ENCOUNTER — OFFICE VISIT (OUTPATIENT)
Dept: ORTHOPEDICS CLINIC | Facility: CLINIC | Age: 45
End: 2020-11-02
Payer: COMMERCIAL

## 2020-11-02 ENCOUNTER — HOSPITAL ENCOUNTER (OUTPATIENT)
Dept: GENERAL RADIOLOGY | Age: 45
Discharge: HOME | End: 2020-11-02
Attending: ORTHOPAEDIC SURGERY
Payer: COMMERCIAL

## 2020-11-02 VITALS — OXYGEN SATURATION: 100 % | HEART RATE: 93 BPM

## 2020-11-02 DIAGNOSIS — Z79.899 ON PRE-EXPOSURE PROPHYLAXIS FOR HIV: ICD-10-CM

## 2020-11-02 DIAGNOSIS — M17.0 PRIMARY OSTEOARTHRITIS OF BOTH KNEES: Primary | ICD-10-CM

## 2020-11-02 DIAGNOSIS — M25.562 LEFT KNEE PAIN, UNSPECIFIED CHRONICITY: ICD-10-CM

## 2020-11-02 DIAGNOSIS — M25.552 LEFT HIP PAIN: ICD-10-CM

## 2020-11-02 DIAGNOSIS — M25.561 RIGHT KNEE PAIN, UNSPECIFIED CHRONICITY: ICD-10-CM

## 2020-11-02 PROCEDURE — 20610 DRAIN/INJ JOINT/BURSA W/O US: CPT | Performed by: ORTHOPAEDIC SURGERY

## 2020-11-02 PROCEDURE — 73564 X-RAY EXAM KNEE 4 OR MORE: CPT | Performed by: ORTHOPAEDIC SURGERY

## 2020-11-02 PROCEDURE — 99203 OFFICE O/P NEW LOW 30 MIN: CPT | Performed by: ORTHOPAEDIC SURGERY

## 2020-11-02 RX ORDER — TRIAMCINOLONE ACETONIDE 40 MG/ML
80 INJECTION, SUSPENSION INTRA-ARTICULAR; INTRAMUSCULAR ONCE
Status: COMPLETED | OUTPATIENT
Start: 2020-11-02 | End: 2020-11-02

## 2020-11-02 RX ORDER — EMTRICITABINE AND TENOFOVIR DISOPROXIL FUMARATE 200; 300 MG/1; MG/1
TABLET, FILM COATED ORAL
Qty: 90 TABLET | Refills: 0 | Status: CANCELLED | OUTPATIENT
Start: 2020-11-02

## 2020-11-02 RX ADMIN — TRIAMCINOLONE ACETONIDE 80 MG: 40 INJECTION, SUSPENSION INTRA-ARTICULAR; INTRAMUSCULAR at 10:18:00

## 2020-11-02 NOTE — H&P
[facility] New Patient Note    CC: Patient presents with:  Knee Pain: bilateral knee pain   Pelvic Pain: left pelvis pain       HPI: This 41year old male presents today with complaints of bilateral knee pain, and left hip pain.  For the knees, anitra CERVICAL FACET INJECTION Left 3/25/2019    Performed by Mich Vela MD at [facility] ENDOSCOPY   • CHOLECYSTECTOMY  2012    Open procedure   • KNEE JOINT INJECTION UNDER FLUOROSCOPY RIGHT OR LEFT Left 4/8/2019    Performed by Mich Vela MD at [facility] ENDOSCOPY   • L Grandmother    • Other (Healthy) Sister    • Other (Healthy) Brother    • Other (Healthy) Brother      Social History    Occupational History      Not on file    Tobacco Use      Smoking status: Former Smoker        Types: Cigarettes        Quit date: 1/1/ endpoint  • Neuromuscular: 5 out of 5 quadriceps strength, sensation intact light touch  • Vascular: Warm well perfused lower extremity      Imaging: X-rays of the left hip and pelvis as well as x-rays of both knees personally viewed, independently interpr multiple pain specialists Dr. Simone Jaramillo and Delmi Mcdonald, and these have included medications, alternative medications, marijuana, nerve blocks, reported steroid injections.   I discussed my recommendation with the patient at this time, with initial management being

## 2020-11-04 RX ORDER — EMTRICITABINE AND TENOFOVIR DISOPROXIL FUMARATE 200; 300 MG/1; MG/1
TABLET, FILM COATED ORAL
Qty: 90 TABLET | Refills: 0 | Status: SHIPPED | OUTPATIENT
Start: 2020-11-04 | End: 2021-01-13

## 2020-11-04 NOTE — TELEPHONE ENCOUNTER
Medication(s) to Refill:   Requested Prescriptions     Pending Prescriptions Disp Refills   • TRUVADA 200-300 MG Oral Tab [Pharmacy Med Name: TRUVADA 200MG-300MG TABLETS] 90 tablet 0     Sig: TAKE 1 TABLET BY MOUTH DAILY       LOV:  8-    RTC:  6 mo

## 2020-11-06 ENCOUNTER — TELEPHONE (OUTPATIENT)
Dept: INTERNAL MEDICINE CLINIC | Facility: CLINIC | Age: 45
End: 2020-11-06

## 2020-11-06 NOTE — TELEPHONE ENCOUNTER
Received fax from insurance stating Truvada will not be covered as of 1/1/2021-   Form placed in your inbasket for signature.

## 2020-12-04 ENCOUNTER — TELEPHONE (OUTPATIENT)
Dept: INTERNAL MEDICINE CLINIC | Facility: CLINIC | Age: 45
End: 2020-12-04

## 2020-12-04 DIAGNOSIS — M54.10 RADICULOPATHY, UNSPECIFIED SPINAL REGION: Primary | ICD-10-CM

## 2020-12-04 NOTE — TELEPHONE ENCOUNTER
[facility]   Cc: CIPRIANO Ortiz Central Referral Pool   Phone Number: [phone]             .Reason for the order/referral:PAIN MANAGEMENT/CONSULT   PCP: FRANCISCO   Refer to Provider [facility]   Specialty:PAIN MANAGEMENT/A

## 2020-12-05 DIAGNOSIS — M54.2 NECK PAIN: Primary | ICD-10-CM

## 2020-12-05 DIAGNOSIS — M54.50 CHRONIC MIDLINE LOW BACK PAIN WITHOUT SCIATICA: ICD-10-CM

## 2020-12-05 DIAGNOSIS — M54.81 OCCIPITAL NEURALGIA OF LEFT SIDE: ICD-10-CM

## 2020-12-05 DIAGNOSIS — G89.29 CHRONIC MIDLINE LOW BACK PAIN WITHOUT SCIATICA: ICD-10-CM

## 2020-12-07 RX ORDER — AMITRIPTYLINE HYDROCHLORIDE 25 MG/1
TABLET, FILM COATED ORAL
Qty: 30 TABLET | Refills: 2 | Status: SHIPPED | OUTPATIENT
Start: 2020-12-07 | End: 2021-04-08

## 2020-12-07 NOTE — TELEPHONE ENCOUNTER
Per TE 3/18/19 patient was to discontinue medication per provider ROSE MARY Newsome  Sent patient [employer] message about scheduling appointment with office.     Medication: AMITRIPTYLINE HCL 25 MG Oral Tab    Date of last refill: 3/14/20 (#30/2)- Discontinu

## 2021-01-11 DIAGNOSIS — Z51.81 THERAPEUTIC DRUG MONITORING: Primary | ICD-10-CM

## 2021-01-11 DIAGNOSIS — Z79.899 ON PRE-EXPOSURE PROPHYLAXIS FOR HIV: ICD-10-CM

## 2021-01-11 DIAGNOSIS — N52.2 DRUG-INDUCED ERECTILE DYSFUNCTION: ICD-10-CM

## 2021-01-11 NOTE — TELEPHONE ENCOUNTER
Elinor Bustillo A  Patient Customer Service Request Pool 40 minutes ago (11:50 AM)        Topic: Other     Good morning.  Can you please change my pharmacy at the [place] on [address] to the [facility] on [address]

## 2021-01-12 ENCOUNTER — TELEPHONE (OUTPATIENT)
Dept: INTERNAL MEDICINE CLINIC | Facility: CLINIC | Age: 46
End: 2021-01-12

## 2021-01-12 NOTE — TELEPHONE ENCOUNTER
Pt made an apt via LVL6brittani Scott. Apt will be cancelled as Dr Andre Scott is not longer pt's pcp, and seems to have establish care w Dr Mirtha Callaway. Pt was informed by psr to call [address] location.  However, message forwarded to clinical as apt was mad

## 2021-01-13 RX ORDER — SILDENAFIL 25 MG/1
25 TABLET, FILM COATED ORAL
Qty: 30 TABLET | Refills: 0 | Status: SHIPPED | OUTPATIENT
Start: 2021-01-13 | End: 2021-04-08

## 2021-01-13 RX ORDER — EMTRICITABINE AND TENOFOVIR DISOPROXIL FUMARATE 200; 300 MG/1; MG/1
1 TABLET, FILM COATED ORAL DAILY
Qty: 90 TABLET | Refills: 0 | Status: SHIPPED | OUTPATIENT
Start: 2021-01-13 | End: 2021-04-29

## 2021-01-13 NOTE — TELEPHONE ENCOUNTER
Last refill:   11/4/2020 Truvada #90 NR -   10/28/2020 Sildenafil 25 mg #30 2R    Last telemedicine 8/31/20 RTC 6 months  No FOV scheduled

## 2021-01-13 NOTE — TELEPHONE ENCOUNTER
Refilled however please remind patient to get creatinine and HIV testing done, as we have discussed with him he needs to have creatinine and HIV tests every 3 months while on Truvada.

## 2021-04-08 DIAGNOSIS — Z79.899 ON PRE-EXPOSURE PROPHYLAXIS FOR HIV: ICD-10-CM

## 2021-04-08 DIAGNOSIS — K59.00 CONSTIPATION, UNSPECIFIED CONSTIPATION TYPE: ICD-10-CM

## 2021-04-08 RX ORDER — LACTULOSE 10 G/15ML
SOLUTION ORAL
Qty: 473 ML | Refills: 0 | Status: CANCELLED | OUTPATIENT
Start: 2021-04-08

## 2021-04-08 RX ORDER — EMTRICITABINE AND TENOFOVIR DISOPROXIL FUMARATE 200; 300 MG/1; MG/1
1 TABLET, FILM COATED ORAL DAILY
Qty: 90 TABLET | Refills: 0 | Status: CANCELLED | OUTPATIENT
Start: 2021-04-08

## 2021-04-08 NOTE — TELEPHONE ENCOUNTER
Pt had new pt visit with Dr Jeff Escudero today.      Failed protocol     Last refill:  emtricitabine-tenofovir (TRUVADA) 200-300 MG Oral Tab 90 tablet 0 1/13/2021    Sig:   Take 1 tablet by mouth daily       LACTULOSE 10 GM/15ML Oral Solution 473 mL 0 3/24

## 2022-02-17 NOTE — TELEPHONE ENCOUNTER
Spoke to patient, confirmed procedure date of 6/12/19  and to be checked in at outpatient registration at 10:00 am. Patient called pre-procedure line and understood instructions. Patient instructed to call office if there are additional questions .
day(s)

## 2025-05-01 NOTE — TELEPHONE ENCOUNTER
Pt has not scheduled further PT visits yet; called to follow-up. He reports that he saw a new pain physician on Fri 11/1, who recommended injections for his knees.  He was supposed to hear back today regarding finalizing those plans, but has not heard back
Adult

## (undated) DEVICE — REMOVER DURAPREP 3M

## (undated) DEVICE — MARKER SKIN 2 TIP

## (undated) DEVICE — GLOVE SURG SENSICARE SZ 7-1/2

## (undated) DEVICE — SHEET, T, LAPAROTOMY, STERILE: Brand: MEDLINE

## (undated) DEVICE — NEEDLE SPINAL 22X3-1/2 BLK

## (undated) DEVICE — GLOVE SURG SENSICARE SZ 7

## (undated) DEVICE — PAIN TRAY: Brand: MEDLINE INDUSTRIES, INC.

## (undated) DEVICE — RF CANNULA, CVD: Brand: VENOM

## (undated) DEVICE — BANDAID COVERLET 1X3

## (undated) DEVICE — NEEDLE SPINAL 22X5 405148

## (undated) DEVICE — AVANOS* TUOHY EPIDURAL NEEDLE: Brand: AVANOS

## (undated) DEVICE — CAP,BOUFFANT,SPUNBOND,BLUE,24": Brand: MEDLINE INDUSTRIES, INC.

## (undated) DEVICE — GLOVE SURG SENSICARE SZ 6-1/2

## (undated) DEVICE — PAD GROUND ELECTRODE 1.5M

## (undated) DEVICE — Device

## (undated) NOTE — MR AVS SNAPSHOT
[place]  [address] [facility] at   [place] [address]  [phone]               Thank you for choosing us for your health care visit with Bianca Priest MD.  We are glad to serve you and happy to provide you with this summa Assoc Dx:  Chronic fatigue syndrome [R53.82]           Testosterone, Total Free, Male [E]    Complete by: Feb 09, 2017 (Approximate)    Assoc Dx:  Chronic fatigue syndrome [R53.82]           Iron And Tibc [E]    Complete by:   Feb 09, 2017 (Approximate) Phone:  [phone]   Fax:  [phone]    Diagnoses:  Skin disorder   Order:  Ishmael Jaramillo - Internal    [place]aila Pham, [address] [facility] [address]   Phone:  [phone]   Fax:  [phone]         Follow-up Instructions     Return in about 1 schedule your appointment. Failure to obtain required authorization numbers can create reimbursement difficulties for you. Assoc Dx:  Skin disorder [L98.9]          [employer]     Sign up for [employer], your secure online medical record.   [employer] will allow HOW TO GET STARTED: HOW TO STAY MOTIVATED:   Start activities slowly and build up over time Do what you like   Get your heart pumping – brisk walking, biking, swimming Even 10 minute increments are effective and add up over the week   2 ½ hours per week –

## (undated) NOTE — ED AVS SNAPSHOT
Wero Harris   MRN: [id number]    Department:  [facility] Emergency Department   Date of Visit:  3/2/2019           Disclosure     Insurance plans vary and the physician(s) referred by the ER may not be covered by your plan.  Please c tell this physician (or your personal doctor if your instructions are to return to your personal doctor) about any new or lasting problems. The primary care or specialist physician will see patients referred from the [facility] Emergency Department.  Oswaldo Starr

## (undated) NOTE — MR AVS SNAPSHOT
LASHAWN GARZA  [address] [place] [address]  [phone]               Thank you for choosing us for your health care visit with Lori Wright PT. We are glad to serve you and happy to provide you with this summary of your visit.   Please help u Take 1 tablet (800 mg total) by mouth 2 (two) times daily. methylPREDNISolone 4 MG Tbpk   As directed. Commonly known as:  MEDROL                   MyChart     Sign up for [employer], your secure online medical record.   [employer] will allow you to a

## (undated) NOTE — MR AVS SNAPSHOT
[place]  [address] [facility] [address]  [address] [facility] [address] [phone]               Thank you for choosing us for your health care visit with Justina Barahona MD.  We are glad to serve you and happy to provide you with this summa BP Pulse Temp Height Weight BMI    140/84 mmHg 72 98 °F (36.7 °C) (Oral) 68.5\" 179 lb 8 oz 26.89 kg/m2         Current Medications          This list is accurate as of: 6/8/17 10:00 AM.  Always use your most recent med list.                ergocalciferol Your blood pressure indicates you may be at-risk for Hypertension. Please consider the following Lifestyle Modifications. Also, please return for a follow-up Blood Pressure Check in 1 month.      Lifestyle Modification Recommendations:    Modification R

## (undated) NOTE — ED AVS SNAPSHOT
[facility] in [address] [place] [address] Drive,[address]    [address] [place] [address]    Phone:  [phone]    Fax:  [phone]           Heather Scott   MRN: [id number]    Department:  [facility]   Date of Visit:  5/31/2017 Metaxalone 800 MG Tabs   Quantity:  20 tablet   Take 1 tablet (800 mg total) by mouth 2 (two) times daily.             Where to Get Your Medications      These medications were sent to [place] [address] [address] [place] [address], [address] [place] [address] [address] [place] [address] Insurance plans vary and the physician(s) referred by the Immediate Care may not be covered by your plan. Please contact your insurance company to determine coverage for follow-up care and referrals. [facility]  [address]  Raquel Orozco If you have been prescribed any medication(s), please fill your prescription right away and begin taking the medication(s) as directed.     If the Immediate Care Provider has read X-rays, these will be re-interpreted by a radiologist.  If there is a signifi can help with your Affordable Care Act coverage, as well as all types of Medicaid plans. To get signed up and covered, please call [phone] and ask to get set up for an insurance coverage that is in-network with Sarita Dixon

## (undated) NOTE — MR AVS SNAPSHOT
[facility]HA[facility] GARZA  [address] [place] [address]  [phone]               Thank you for choosing us for your health care visit with Dakota Calabrese PT. We are glad to serve you and happy to provide you with this summary of your visit.   Please help u Take 1 tablet by mouth every 6 (six) hours as needed for Pain. Take with food. Stop for GI distress. Drink more water to prevent constipation.    Commonly known as:  NORCO           Metaxalone 800 MG Tabs   Take 1 tablet (800 mg total) by mouth 2 (two) time

## (undated) NOTE — LETTER
2019      Regarding: Aundrea Gloria,  1975  In response to Case ID [id number], denial for MRA neck (CPT 70306)      To Whom it May Concern:    Mr. Paul Abraham is being followed by my office for migraines and transient neurological sy

## (undated) NOTE — Clinical Note
Date: 6/2/2017    Patient Name: Angeli Day          To Whom it may concern: The above patient is being seen at the [facility] for treatment of a medical condition.     This patient should be excused from attending work today, 6/2/17

## (undated) NOTE — MR AVS SNAPSHOT
[place]  [address] [facility] [address]  [address] [facility] [address] [phone]               Thank you for choosing us for your health care visit with Maya Stacy MD.  We are glad to serve you and happy to provide you with this summa Today's Vital Signs     BP Pulse Temp Height Weight BMI    120/80 mmHg 75 97.6 °F (36.4 °C) (Oral) 68\" 181 lb 8 oz 27.60 kg/m2         Current Medications          This list is accurate as of: 3/9/17  9:34 AM.  Always use your most recent med list.

## (undated) NOTE — MR AVS SNAPSHOT
[facility]  [address] [place] [address]  [phone]               Thank you for choosing us for your health care visit with Everette Pablo PT. We are glad to serve you and happy to provide you with this summary of your visit.   Please help u Take 1 tablet by mouth every 6 (six) hours as needed for Pain. Take with food. Stop for GI distress. Drink more water to prevent constipation.    Commonly known as:  NORCO           Metaxalone 800 MG Tabs   Take 1 tablet (800 mg total) by mouth 2 (two) time

## (undated) NOTE — MR AVS SNAPSHOT
[place]  [address] [facility] [address]  [place] [address]  [phone]               Thank you for choosing us for your health care visit with Stephy Nicholson MD.  We are glad to serve you and happy to provide you with this s call [facility] at [phone]. Thursday May 25, 2017     Imaging:  XR THORACIC SPINE (1 VIEW)  (CPT=72020)    Instructions:   To schedule an appointment for your radiology test please call [facility] Scheduling a Bring a paper prescription for each of these medications    - ALPRAZolam 0.25 MG Tabs            MyChart     Sign up for [facility], your secure online medical record.   [facility] will allow you to access patient instructions from your recent visit,  view other

## (undated) NOTE — MR AVS SNAPSHOT
[facility]  [address] [place] [address]  [phone]               Thank you for choosing us for your health care visit with Joy Real PT. We are glad to serve you and happy to provide you with this summary of your visit.   Please help u ALPRAZolam 0.25 MG Tabs   Take 1 tablet (0.25 mg total) by mouth every 6 (six) hours as needed for Anxiety. Commonly known as:  XANAX           ergocalciferol 69243 units Caps   Take 1 capsule (50,000 Units total) by mouth every 7 days.    Commonly kn

## (undated) NOTE — LETTER
Patient Name: Sharif Herring        : 1975       Medical Record #: [id number]    CONSENT FOR PROCEDURES/SEDATION    Date: 2019       Time: 2:58 PM        1.  I authorize the performance upon Sharif Herring the followin

## (undated) NOTE — LETTER
[facility] [place]  [address] [address]  Dept: [phone]  Dept Fax: [phone]         May 31, 2017    Patient: Luis West   YOB: 1975   Date of Visit: 5/31/2017       To Whom It May Concern:

## (undated) NOTE — LETTER
08/19/20        Batool Martinez [address] [address]      Dear Daphney Connelly,    Our records indicate that you have outstanding lab work and or testing that was ordered for you and has not yet been completed: Fasting lab work

## (undated) NOTE — Clinical Note
Date: 5/25/2017    Patient Name: Josi Sprague          To Whom it may concern: The above patient was seen at the [facility] for treatment of a medical condition.     This patient should be excused for any time missed from work today (